# Patient Record
Sex: FEMALE | Race: WHITE | Employment: FULL TIME | URBAN - METROPOLITAN AREA
[De-identification: names, ages, dates, MRNs, and addresses within clinical notes are randomized per-mention and may not be internally consistent; named-entity substitution may affect disease eponyms.]

---

## 2019-07-22 ENCOUNTER — OFFICE VISIT (OUTPATIENT)
Dept: OBGYN CLINIC | Facility: CLINIC | Age: 39
End: 2019-07-22
Payer: COMMERCIAL

## 2019-07-22 VITALS
HEIGHT: 66.5 IN | HEART RATE: 73 BPM | DIASTOLIC BLOOD PRESSURE: 83 MMHG | WEIGHT: 205.38 LBS | BODY MASS INDEX: 32.62 KG/M2 | SYSTOLIC BLOOD PRESSURE: 132 MMHG

## 2019-07-22 DIAGNOSIS — N92.0 EXCESSIVE OR FREQUENT MENSTRUATION: ICD-10-CM

## 2019-07-22 DIAGNOSIS — Z12.4 SCREENING FOR MALIGNANT NEOPLASM OF CERVIX: ICD-10-CM

## 2019-07-22 DIAGNOSIS — Z01.419 ENCOUNTER FOR ANNUAL ROUTINE GYNECOLOGICAL EXAMINATION: Primary | ICD-10-CM

## 2019-07-22 DIAGNOSIS — F17.200 CURRENT EVERY DAY SMOKER: ICD-10-CM

## 2019-07-22 DIAGNOSIS — N93.8 DUB (DYSFUNCTIONAL UTERINE BLEEDING): ICD-10-CM

## 2019-07-22 DIAGNOSIS — N60.09 BREAST CYST, UNSPECIFIED LATERALITY: ICD-10-CM

## 2019-07-22 LAB
CONTROL LINE PRESENT WITH A CLEAR BACKGROUND (YES/NO): YES YES/NO
KIT LOT #: NORMAL NUMERIC
PREGNANCY TEST, URINE: NEGATIVE

## 2019-07-22 PROCEDURE — 58100 BIOPSY OF UTERUS LINING: CPT | Performed by: ADVANCED PRACTICE MIDWIFE

## 2019-07-22 PROCEDURE — 81025 URINE PREGNANCY TEST: CPT | Performed by: ADVANCED PRACTICE MIDWIFE

## 2019-07-22 PROCEDURE — 99386 PREV VISIT NEW AGE 40-64: CPT | Performed by: ADVANCED PRACTICE MIDWIFE

## 2019-07-22 RX ORDER — BUPROPION HYDROCHLORIDE 75 MG/1
75 TABLET ORAL DAILY
COMMUNITY
End: 2019-08-14

## 2019-07-22 RX ORDER — ESCITALOPRAM OXALATE 20 MG/1
20 TABLET ORAL DAILY
COMMUNITY
End: 2019-09-07 | Stop reason: ALTCHOICE

## 2019-07-22 NOTE — PROGRESS NOTES
HPI:    Patient ID: Hay Morales is a 37year old female who for her annual gyne exam.  Pt reports menses q 28 days x 7 days x regluar flow with intercycle spotting x 6 months. Report night sweats mainly around menses. Hx of 1 .  Reports having a mammo exhibits no discharge. Right adnexum displays no mass, no tenderness and no fullness. Left adnexum displays no mass, no tenderness and no fullness. There is bleeding in the vagina. No erythema or tenderness in the vagina.  No signs of injury around the vagi this encounter       Imaging & Referrals:  AC[facility]URE [place] - INTERNAL REFERRAL  US PELVIS (TRANSVAGINAL PELVIS) (CPT=76830)  [facility] RADHA 2D+3D SCREENING BILAT (CPT=77067/36020)         VI#[id number]

## 2019-07-23 LAB — HPV I/H RISK 1 DNA SPEC QL NAA+PROBE: NEGATIVE

## 2019-07-24 ENCOUNTER — TELEPHONE (OUTPATIENT)
Dept: OBGYN CLINIC | Facility: CLINIC | Age: 39
End: 2019-07-24

## 2019-07-24 NOTE — TELEPHONE ENCOUNTER
----- Message from Dakota Meza CNM sent at 7/24/2019  5:43 PM CDT -----  Please call this pt biopsy    Endometrium; biopsy:  · Benign secretory pattern endometrium    Pap is still in process.   I would like her to complete her pelvic u/s

## 2019-07-24 NOTE — TELEPHONE ENCOUNTER
Spoke with pt advised of results and MES rec's. Pt agreed and voiced understanding. Pt has appt scheduled for pelvic U/S on 7/31.

## 2019-07-25 ENCOUNTER — TELEPHONE (OUTPATIENT)
Dept: OBGYN CLINIC | Facility: CLINIC | Age: 39
End: 2019-07-25

## 2019-07-25 NOTE — TELEPHONE ENCOUNTER
Notified pt of pap w/ insufficient cells & need for repeat. Pt has pelvic u/s on Weds. Appt scheduled following.  Pt verbalized an understanding & agrees w/ plan

## 2019-07-25 NOTE — TELEPHONE ENCOUNTER
----- Message from Karlene Estes CNM sent at 7/25/2019  4:32 PM CDT -----  No enough cells on pap to evaluate.   Pt needs to return for repeat pap

## 2019-07-27 ENCOUNTER — TELEPHONE (OUTPATIENT)
Dept: OBGYN CLINIC | Facility: CLINIC | Age: 39
End: 2019-07-27

## 2019-07-27 LAB
ALBUMIN/GLOBULIN RATIO: 1.9 (CALC) (ref 1–2.5)
ALBUMIN: 4.1 G/DL (ref 3.6–5.1)
ALKALINE PHOSPHATASE: 56 U/L (ref 33–115)
ALT: 13 U/L (ref 6–29)
AST: 15 U/L (ref 10–30)
BILIRUBIN, TOTAL: 0.3 MG/DL (ref 0.2–1.2)
BUN: 22 MG/DL (ref 7–25)
CALCIUM: 9.1 MG/DL (ref 8.6–10.2)
CARBON DIOXIDE: 28 MMOL/L (ref 20–32)
CHLORIDE: 104 MMOL/L (ref 98–110)
CHOL/HDLC RATIO: 3 (CALC)
CHOLESTEROL, TOTAL: 196 MG/DL
CREATININE: 0.99 MG/DL (ref 0.5–1.1)
EGFR IF AFRICN AM: 82 ML/MIN/1.73M2
EGFR IF NONAFRICN AM: 71 ML/MIN/1.73M2
GLOBULIN: 2.2 G/DL (CALC) (ref 1.9–3.7)
GLUCOSE: 87 MG/DL (ref 65–99)
HDL CHOLESTEROL: 66 MG/DL
HEMATOCRIT: 39.1 % (ref 35–45)
HEMOGLOBIN A1C: 4.9 % OF TOTAL HGB
HEMOGLOBIN: 12.8 G/DL (ref 11.7–15.5)
LDL-CHOLESTEROL: 116 MG/DL (CALC)
MCH: 30.1 PG (ref 27–33)
MCHC: 32.7 G/DL (ref 32–36)
MCV: 92 FL (ref 80–100)
MPV: 9.5 FL (ref 7.5–12.5)
NON-HDL CHOLESTEROL: 130 MG/DL (CALC)
PLATELET COUNT: 279 THOUSAND/UL (ref 140–400)
POTASSIUM: 4.6 MMOL/L (ref 3.5–5.3)
PROTEIN, TOTAL: 6.3 G/DL (ref 6.1–8.1)
RDW: 12.9 % (ref 11–15)
RED BLOOD CELL COUNT: 4.25 MILLION/UL (ref 3.8–5.1)
SODIUM: 137 MMOL/L (ref 135–146)
TRIGLYCERIDES: 45 MG/DL
TSH: 2.26 MIU/L
WHITE BLOOD CELL COUNT: 4.6 THOUSAND/UL (ref 3.8–10.8)

## 2019-07-27 NOTE — TELEPHONE ENCOUNTER
----- Message from Yasmin Mallory CNM sent at 7/27/2019  8:29 AM CDT -----  Baseline labs look good  Some slight elevation on LDL and total cholesterol. Dietary changes, weight loss and exercise.   Repeat Lipid panel in 1 year

## 2019-07-29 ENCOUNTER — TELEPHONE (OUTPATIENT)
Dept: OBGYN CLINIC | Facility: CLINIC | Age: 39
End: 2019-07-29

## 2019-07-31 ENCOUNTER — OFFICE VISIT (OUTPATIENT)
Dept: OBGYN CLINIC | Facility: CLINIC | Age: 39
End: 2019-07-31
Payer: COMMERCIAL

## 2019-07-31 ENCOUNTER — HOSPITAL ENCOUNTER (OUTPATIENT)
Dept: ULTRASOUND IMAGING | Facility: HOSPITAL | Age: 39
Discharge: HOME | End: 2019-07-31
Attending: ADVANCED PRACTICE MIDWIFE
Payer: COMMERCIAL

## 2019-07-31 VITALS
BODY MASS INDEX: 32.62 KG/M2 | DIASTOLIC BLOOD PRESSURE: 88 MMHG | HEART RATE: 77 BPM | HEIGHT: 66 IN | WEIGHT: 203 LBS | SYSTOLIC BLOOD PRESSURE: 125 MMHG

## 2019-07-31 DIAGNOSIS — R87.615 ENCOUNTER FOR REPEAT PAP SMEAR DUE TO PREVIOUS INSUFF CERVICAL CELLS: Primary | ICD-10-CM

## 2019-07-31 DIAGNOSIS — N93.8 DUB (DYSFUNCTIONAL UTERINE BLEEDING): ICD-10-CM

## 2019-07-31 PROCEDURE — 76856 US EXAM PELVIC COMPLETE: CPT | Performed by: ADVANCED PRACTICE MIDWIFE

## 2019-07-31 PROCEDURE — 76830 TRANSVAGINAL US NON-OB: CPT | Performed by: ADVANCED PRACTICE MIDWIFE

## 2019-08-01 ENCOUNTER — TELEPHONE (OUTPATIENT)
Dept: OBGYN CLINIC | Facility: CLINIC | Age: 39
End: 2019-08-01

## 2019-08-01 NOTE — TELEPHONE ENCOUNTER
----- Message from Bridgette Desai CNM sent at 8/1/2019 11:37 AM CDT -----  Please call her u/s was normal.  I would recommend a Mirena IUD to control her dysfunctional bleeding.   Please have pt set up an appointment to review options

## 2019-08-02 NOTE — TELEPHONE ENCOUNTER
Spoke with pt and informed her per MES. Pt declines IUD, reports having Mirena in the past and was unhappy with it. Also declines any other options to control bleeding right now. States \"I'm just happy nothing is wrong. \" Will call back and schedule appt

## 2019-08-08 ENCOUNTER — PATIENT MESSAGE (OUTPATIENT)
Dept: OBGYN CLINIC | Facility: CLINIC | Age: 39
End: 2019-08-08

## 2019-08-14 PROBLEM — F41.9 ANXIETY: Status: ACTIVE | Noted: 2019-08-14

## 2019-08-14 NOTE — PROGRESS NOTES
Patient ID: Meron Mendoza is a 37year old female.   Patient presents with:  Establish Care  Medication Follow-Up: Per patient is taking anxiety medication and will like to discuss a change on medications        HISTORY OF PRESENT ILLNESS:   HPI  Patient , Rfl:      Allergies:No Known Allergies    Social History    Socioeconomic History      Marital status:       Spouse name: Not on file      Number of children: Not on file      Years of education: Not on file      Highest education level: Not on [place] well-nourished. No distress. Eyes: No scleral icterus. Neurological: She is alert. Psychiatric: Her mood appears anxious. Her speech is delayed. She exhibits a depressed mood. ASSESSMENT/PLAN:   1.  Anxiety  · OP REFERRAL TO [facility]  · Pedro Goldmann

## 2019-08-14 NOTE — PATIENT INSTRUCTIONS
1  Take half a tablet of your Lexapro (10mg) daily for 2 weeks. Then take half table every other day for 2 weeks and stop Lexapro completely (1 month taper). 2.  Begin Pristiq.   3.  Make consultation with therapist.      Understanding Anxiety Disorders disorder. This causes constant worry that can greatly disrupt your life. Getting better  You may believe that nothing can help you. Or, you might fear what others may think. But most anxiety symptoms can be eased.  Having an anxiety disorder is nothing t positive, realistic thoughts. · Behavioral therapy helps you change how you react to anxiety. You’ll learn coping skills and methods for relaxing to help you better deal with anxiety.   Other forms of therapy  Other therapy methods may work better for you course. · Exercise — it’s a great way to relieve tension and help your body feel relaxed. · Examine your life for stress, and try to find ways to reduce it. · Avoid caffeine and nicotine, which can make anxiety symptoms worse.   · Fight the temptation to back or cause dangerous withdrawal symptoms. · Anti-anxiety medicine. This medicine eases symptoms and helps you relax. Your healthcare provider will explain when and how to use it. It may be prescribed for use before situations that make you anxious.  You Ask your healthcare provider or pharmacist what you can expect. They may have ideas for avoiding some side effects. · Sexual problems. Some antidepressants can affect your desire for sex or your ability to have an orgasm.  A change in dosage or medicine of

## 2019-09-07 DIAGNOSIS — F41.9 ANXIETY: ICD-10-CM

## 2019-09-07 RX ORDER — DESVENLAFAXINE 50 MG/1
TABLET, EXTENDED RELEASE ORAL
Qty: 90 TABLET | Refills: 1 | Status: SHIPPED | OUTPATIENT
Start: 2019-09-07 | End: 2020-03-06

## 2019-09-07 RX ORDER — BUPROPION HYDROCHLORIDE 75 MG/1
TABLET ORAL
Qty: 90 TABLET | Refills: 1 | Status: SHIPPED | OUTPATIENT
Start: 2019-09-07 | End: 2020-03-06

## 2019-09-08 NOTE — TELEPHONE ENCOUNTER
Refill passed per [facility] [place] protocol.     Refill Protocol Appointment Criteria  · Appointment scheduled in the past 6 months or in the next 3 months  Recent Outpatient Visits            3 weeks ago Ghazala Hendricks, Willa

## 2019-09-10 NOTE — PROGRESS NOTES
Patient ID: Effie Duval is a 37year old female. Patient presents with: Follow - Up: medication review       HISTORY OF PRESENT ILLNESS:   HPI  Patient. Follow-up of multiple issues. Initial visit here 1 month ago for anxiety.   Medications were s Highest education level: Not on file    Occupational History      Not on file    Social Needs      Financial resource strain: Not on file      Food insecurity:        Worry: Not on file        Inability: Not on file      Transportation needs:        Kinjal Krause exhibits decreased range of motion. Left shoulder: She exhibits decreased range of motion. Neurological: She is alert. Psychiatric: She exhibits a depressed mood. ASSESSMENT/PLAN:   1. Anxiety  · Continue present cocktail medications.   ·

## 2019-09-10 NOTE — PATIENT INSTRUCTIONS
Exercises for Shoulder Flexibility: External Rotation    This stretch can help restore shoulder flexibility and relieve pain over time. When stretching, be sure to breathe deeply.  Follow any special instructions from your healthcare provider or physical 1. While seated, move the arm on the side you want to stretch toward the middle of your back. The palm of your hand should face out. 2. Cup your other hand under the hand that’s behind your back.  Gently push your cupped hand upward until you feel the stre 6. With your other hand, push the raised elbow toward the opposite shoulder. Avoid turning your head. Stop when you feel the stretch. Try to hold the stretch for 5 seconds. 7. Work up to [employer] of this stretch, 3 times a day.  Work up to holding the · Pull down gently on the towel with your front arm. Let your back arm slide up as high as is comfortable. You’ll feel a stretch in your shoulder. Hold the stretch for a few seconds. · Repeat 3 to 5 times.  Build up to holding each stretch for 30 to 60 sec

## 2019-10-21 ENCOUNTER — TELEPHONE (OUTPATIENT)
Dept: OBGYN CLINIC | Facility: CLINIC | Age: 39
End: 2019-10-21

## 2019-11-13 ENCOUNTER — MED REC SCAN ONLY (OUTPATIENT)
Dept: INTERNAL MEDICINE CLINIC | Facility: CLINIC | Age: 39
End: 2019-11-13

## 2019-12-21 ENCOUNTER — TELEPHONE (OUTPATIENT)
Dept: OBGYN CLINIC | Facility: CLINIC | Age: 39
End: 2019-12-21

## 2019-12-21 NOTE — TELEPHONE ENCOUNTER
Reminded pt to complete mammo. Pt states she needs to get records from [place] first. Pt will call to schedule.  Pt verbalized an understanding & agrees w/ plan

## 2020-03-05 DIAGNOSIS — F41.9 ANXIETY: ICD-10-CM

## 2020-03-06 RX ORDER — DESVENLAFAXINE 50 MG/1
TABLET, EXTENDED RELEASE ORAL
Qty: 90 TABLET | Refills: 1 | Status: SHIPPED | OUTPATIENT
Start: 2020-03-06 | End: 2020-08-31

## 2020-03-06 RX ORDER — BUPROPION HYDROCHLORIDE 75 MG/1
TABLET ORAL
Qty: 90 TABLET | Refills: 1 | Status: SHIPPED | OUTPATIENT
Start: 2020-03-06 | End: 2020-08-14

## 2020-03-06 NOTE — TELEPHONE ENCOUNTER
Scheduled follow up visit with Dr. Melita Green on 3/31 at 6:30 pm.    Refill Protocol Appointment Criteria  · Appointment scheduled in the past 6 months or in the next 3 months  Recent Outpatient Visits            5 months ago Sahara W Brown

## 2020-05-05 NOTE — PROGRESS NOTES
Patient ID: Bassam Gonzalez is a 37year old female. Patient presents with:  Medication Request       Virtual/Telephone Check-In    Bassam Gonzalez verbally consents to a Virtual/Telephone Check-In service on 05/05/20.  Patient understands and accepts financ Current Every Day Smoker        Packs/day: 0.50        Types: Cigarettes      Smokeless tobacco: Never Used      Tobacco comment: 7 daily     Substance and Sexual Activity      Alcohol use: Yes        Frequency: Monthly or less        Comment: Socially or acute distress. Please note that the following visit was completed using two-way, real-time interactive audio and/or video communication.   This has been done in good taco to provide continuity of care in the best interest of the provider-patient rel

## 2020-05-05 NOTE — PATIENT INSTRUCTIONS
Understanding Anxiety Disorders  Almost everyone gets nervous now and then. It’s normal to have knots in your stomach before a test. Or for your heart to beat fast on a first date. But an anxiety disorder is much more than a case of nerves.  In fact, its You may believe that nothing can help you. Or, you might fear what others may think. But most anxiety symptoms can be eased. Having an anxiety disorder is nothing to be ashamed of.  Most people do best with treatment that combines medicine and individual an

## 2020-06-25 PROBLEM — Z86.59 HISTORY OF ANXIETY: Status: ACTIVE | Noted: 2020-06-25

## 2020-06-25 PROBLEM — E78.00 MILD HYPERCHOLESTEROLEMIA: Status: ACTIVE | Noted: 2020-06-25

## 2020-06-25 PROBLEM — R63.2 BINGE EATING: Status: ACTIVE | Noted: 2020-06-25

## 2020-06-25 PROBLEM — R63.5 WEIGHT GAIN: Status: ACTIVE | Noted: 2020-06-25

## 2020-06-25 PROBLEM — E66.9 OBESITY (BMI 30-39.9): Status: ACTIVE | Noted: 2020-06-25

## 2020-06-25 NOTE — PROGRESS NOTES
The Wellness and Weight Loss Consultation Note       Date of Consult:  2020    Patient:  Rigoberto Gale  :      1980  MRN:      [id number]    Referring Provider: Dr. Layo Carrillo       Chief Complaint:  Patient presents with:  Consult  Weight Manag tablet 1   • BUPROPION HCL 75 MG Oral Tab TAKE 1 TABLET BY MOUTH EVERY DAY 90 tablet 1       Allergies:  Patient has no known allergies.      Comorbidities:  HLD, Anxiety     Social History:  Social History    Socioeconomic History      Marital status: Arin Sandoval family history.         Typical Dietary Intake:  Breakfast AM Snack Lunch PM Snack Dinner   Greek yogurt   Berries    Coffee- coffeemate + equal  Snack pack- trail mix    Protein shake  Oatmeal (instant flavored)  Fruit  Protein Bar  Eggs, spinach, toast Physical Exam:  General appearance: alert, appears stated age, cooperative and obese   Head: Normocephalic, without obvious abnormality, atraumatic  Neck: no adenopathy, no carotid bruit, no JVD, supple, symmetrical, trachea midline and thyroid not enl B12  -     LEPTIN, SERUM; Future    Binge eating  -     PSYCHOLOGY - INTERNAL  -     DIETITIAN EDUCATION INITIAL, DIET (INTERNAL)  -     VITAMIN D, 25-HYDROXY  -     VITAMIN B12  -     LEPTIN, SERUM; Future      HYPERCHOLESTEROLEMIA:  Recommend dietary steven other weight loss medications including phentermine, Vyvanse, Topiramate- reports daily marijuana use. Meet with RD. Meet with Dr. Sharon Christian- BED 7+, Anxiety, +PHQ. Consider hypnotherapy for smoking cessation and weight loss.      Goals:   -Health

## 2020-06-25 NOTE — PATIENT INSTRUCTIONS
Values: Health, Being Active, Feeling Good     Skullcap tea or bone broth. Eat foods high in tryptophan and healthy omega 3 fats (these naturally elevate the happiness hormones and mood):   Walnuts, almonds, flaxseeds, alex seeds, sesame seeds, pumpkin s increase this to half your body weight in ounces/day. Aim for total daily carbohydrates:  g/day- non starchy vegetables are free after that (ex. greens, peppers, broccoli, cauliflower). Aim for 65-80 g protein per day.     Aim for 3-4 servings vegetable soup prior to meals. Aim for 2 handfuls of greens a day   Add:  Garlic powder (1/4 tsp)  Black cumin (1/4 tsp)     Return to clinic in 3 weeks for lifestyle assessment: video visit.      Check on insurance coverage for weight loss medications/

## 2020-07-08 ENCOUNTER — TELEMEDICINE (OUTPATIENT)
Dept: SURGERY | Facility: CLINIC | Age: 40
End: 2020-07-08

## 2020-07-08 DIAGNOSIS — E66.9 OBESITY (BMI 30-39.9): ICD-10-CM

## 2020-07-08 DIAGNOSIS — R63.2 BINGE EATING: ICD-10-CM

## 2020-07-08 DIAGNOSIS — E78.00 MILD HYPERCHOLESTEROLEMIA: Primary | ICD-10-CM

## 2020-07-08 DIAGNOSIS — R63.5 WEIGHT GAIN: ICD-10-CM

## 2020-07-08 DIAGNOSIS — Z86.59 HISTORY OF ANXIETY: ICD-10-CM

## 2020-07-08 PROCEDURE — 99213 OFFICE O/P EST LOW 20 MIN: CPT | Performed by: NURSE PRACTITIONER

## 2020-07-08 NOTE — PROGRESS NOTES
Virtual Video/Telephone Check-In    Pacheco Lee verbally consents to a Virtual Video/Telephone Check-In visit on 07/08/20. Patient has been referred to the [facility] website at [email or web address] to review the yearly Consent to Treat document.     Pa History:   Past Medical History:   Diagnosis Date   • Anxiety         Comorbidities:  SOB/GLORIA-Improvement?  no and Other:  Anxiety: no change     OBJECTIVE     Vitals: LMP 08/19/2019 (Approximate)     Initial weight loss:     Total weight loss:     Start we Attends Buddhism service: Not on file        Active member of club or organization: Not on file        Attends meetings of clubs or organizations: Not on file        Relationship status: Not on file      Intimate partner violence:        Fear of current o · Patient has a Food Journal?: yes   · Patient is reading nutrition labels? yes  · Average Caloric Intake: At times   · Average CHO Intake:   · Is patient exercising? yes  · Type of exercise?  Other:  Weights 3 days/week; Cardio 2 days/week- 60 minute and cooperative  Head: Normocephalic, without obvious abnormality, atraumatic  Neck: symmetrical, trachea midline   Lungs: No increased work of breathing   Extremities: extremities normal  Skin: Upper body skin color and texture appear intact       ASSESSM grains, flour, sugar.     Goals for next month:  1. Keep a food log, aim for 100 grams carbs/day. Meet with RD.   2. Drink 64 ounces of non-caloric beverages per day. No fruit juices or regular soda. 3. Aim for 150 minutes moderate exercise per week.

## 2020-07-08 NOTE — PATIENT INSTRUCTIONS
Annette Lucas dietician. Goals:   Nutrition:   -Healthy Plate method for dinners. -Aim for 2 fruits/day. Avocado, tomato, berries, oranges, apples, zenaida/limes. -Aim for 3 vegetable servings a day.   -Journal food/beverage intake on weekends

## 2020-07-20 ENCOUNTER — OFFICE VISIT (OUTPATIENT)
Dept: SURGERY | Facility: CLINIC | Age: 40
End: 2020-07-20
Payer: COMMERCIAL

## 2020-07-20 VITALS
DIASTOLIC BLOOD PRESSURE: 80 MMHG | WEIGHT: 221 LBS | OXYGEN SATURATION: 99 % | BODY MASS INDEX: 35.1 KG/M2 | HEIGHT: 66.5 IN | HEART RATE: 78 BPM | SYSTOLIC BLOOD PRESSURE: 140 MMHG

## 2020-07-20 DIAGNOSIS — E66.9 OBESITY (BMI 30-39.9): ICD-10-CM

## 2020-07-20 DIAGNOSIS — E78.00 MILD HYPERCHOLESTEROLEMIA: Primary | ICD-10-CM

## 2020-07-20 DIAGNOSIS — R63.2 BINGE EATING: ICD-10-CM

## 2020-07-20 DIAGNOSIS — Z86.59 HISTORY OF ANXIETY: ICD-10-CM

## 2020-07-20 PROCEDURE — 99214 OFFICE O/P EST MOD 30 MIN: CPT | Performed by: NURSE PRACTITIONER

## 2020-07-20 PROCEDURE — 3008F BODY MASS INDEX DOCD: CPT | Performed by: NURSE PRACTITIONER

## 2020-07-20 PROCEDURE — 3077F SYST BP >= 140 MM HG: CPT | Performed by: NURSE PRACTITIONER

## 2020-07-20 PROCEDURE — 3079F DIAST BP 80-89 MM HG: CPT | Performed by: NURSE PRACTITIONER

## 2020-07-20 RX ORDER — PHENTERMINE HYDROCHLORIDE 15 MG/1
15 CAPSULE ORAL EVERY MORNING
Qty: 30 CAPSULE | Refills: 0 | Status: SHIPPED | OUTPATIENT
Start: 2020-07-20 | End: 2020-10-09

## 2020-07-20 NOTE — PATIENT INSTRUCTIONS
Goals:   Nutrition:   -Healthy Plate method for dinners. -Aim for 2 fruits/day. Avocado, tomato, berries, oranges, apples, zenaida/limes. -Aim for 3 vegetable servings a day. -Journal food/beverage intake on weekends.    -IF 12 hours consistently

## 2020-07-20 NOTE — PROGRESS NOTES
Patient:  Yaa Juarez  :      1980  MRN:      [id number]    Chief Complaint:  Weight Managment    SUBJECTIVE     History of Present Illness:  Hitesh Leon is being seen today for a follow-up for medically supported weight loss.      Patient r (92.1 kg)  07/22/19 : 205 lb 6.4 oz (93.2 kg)      Patient Medications:    Current Outpatient Medications   Medication Sig Dispense Refill   • Phentermine HCl 15 MG Oral Cap Take 1 capsule (15 mg total) by mouth every morning.  30 capsule 0   • DESVENLAFAXI Physically abused: Not on file        Forced sexual activity: Not on file    Other Topics      Concerns:        Not on file    Social History Narrative      Not on file    Surgical History:  History reviewed. No pertinent surgical history.   Family History: per day  · Length of time it takes to consume a meal:    · # of snacks per day: None   · Type of snacks:    · Amount of soda consumption per day:  2 cans soda/week   · Amount of water (in ounces) per day:  64 oz water/day  · Drinking between meals only:  y elevation.     Lab Results   Component Value Date/Time    CHOLEST 196 07/26/2019 08:13 AM     (H) 07/26/2019 08:13 AM    HDL 66 07/26/2019 08:13 AM    TRIG 45 07/26/2019 08:13 AM       Encounter Diagnosis(ses):   Mild hypercholesterolemia  (primary e carbohydrates, refined grains, flour, sugar.     Goals for next month:  1. Keep a food log, aim for 100 grams carbs/day. Meet with RD.   2. Drink 64 ounces of non-caloric beverages per day. No fruit juices or regular soda.   3. Aim for 150 minutes moderate insurance coverage.      RTC one month.      Raghu Dove, APRN

## 2020-07-21 ENCOUNTER — OFFICE VISIT (OUTPATIENT)
Dept: INTERNAL MEDICINE CLINIC | Facility: CLINIC | Age: 40
End: 2020-07-21
Payer: COMMERCIAL

## 2020-07-21 VITALS
WEIGHT: 219.38 LBS | BODY MASS INDEX: 35.26 KG/M2 | HEIGHT: 66 IN | TEMPERATURE: 99 F | SYSTOLIC BLOOD PRESSURE: 132 MMHG | HEART RATE: 78 BPM | DIASTOLIC BLOOD PRESSURE: 87 MMHG

## 2020-07-21 DIAGNOSIS — E66.9 OBESITY (BMI 30-39.9): ICD-10-CM

## 2020-07-21 DIAGNOSIS — F41.9 ANXIETY: ICD-10-CM

## 2020-07-21 DIAGNOSIS — Z00.00 ANNUAL PHYSICAL EXAM: Primary | ICD-10-CM

## 2020-07-21 DIAGNOSIS — R92.8 ABNORMAL MAMMOGRAM: ICD-10-CM

## 2020-07-21 DIAGNOSIS — F17.210 CIGARETTE NICOTINE DEPENDENCE WITHOUT COMPLICATION: ICD-10-CM

## 2020-07-21 PROCEDURE — 3079F DIAST BP 80-89 MM HG: CPT | Performed by: INTERNAL MEDICINE

## 2020-07-21 PROCEDURE — 3075F SYST BP GE 130 - 139MM HG: CPT | Performed by: INTERNAL MEDICINE

## 2020-07-21 PROCEDURE — 99396 PREV VISIT EST AGE 40-64: CPT | Performed by: INTERNAL MEDICINE

## 2020-07-21 PROCEDURE — 3008F BODY MASS INDEX DOCD: CPT | Performed by: INTERNAL MEDICINE

## 2020-07-21 NOTE — PROGRESS NOTES
Patient ID: Jr Mcneal is a 41year old female. Patient presents with:  Physical: Pt states px       HISTORY OF PRESENT ILLNESS:   HPI  Patient presents for above. Here for annual physical and to discuss other medical issues.     She is a longstandin Phentermine HCl 15 MG Oral Cap, Take 1 capsule (15 mg total) by mouth every morning., Disp: 30 capsule, Rfl: 0  •  DESVENLAFAXINE SUCCINATE ER 50 MG Oral Tablet 24 Hr, TAKE 1 TABLET BY MOUTH EVERY DAY, Disp: 90 tablet, Rfl: 1  •  BUPROPION HCL 75 MG Oral T History Narrative      Not on file          PHYSICAL EXAM:      07/21/20  1752   BP: 132/87   Pulse: 78   Temp: 98.9 °F (37.2 °C)   TempSrc: Temporal   Weight: 219 lb 6.4 oz (99.5 kg)   Height: 5' 6\" (1.676 m)       Body mass index is 35.41 kg/m².     Phys

## 2020-07-24 ENCOUNTER — LAB ENCOUNTER (OUTPATIENT)
Dept: LAB | Age: 40
End: 2020-07-24
Attending: INTERNAL MEDICINE
Payer: COMMERCIAL

## 2020-07-24 ENCOUNTER — APPOINTMENT (OUTPATIENT)
Dept: LAB | Age: 40
End: 2020-07-24
Attending: INTERNAL MEDICINE
Payer: COMMERCIAL

## 2020-07-24 DIAGNOSIS — Z86.59 HISTORY OF ANXIETY: ICD-10-CM

## 2020-07-24 DIAGNOSIS — R63.2 BINGE EATING: ICD-10-CM

## 2020-07-24 DIAGNOSIS — R63.5 WEIGHT GAIN: ICD-10-CM

## 2020-07-24 DIAGNOSIS — E78.00 MILD HYPERCHOLESTEROLEMIA: ICD-10-CM

## 2020-07-24 DIAGNOSIS — E66.9 OBESITY (BMI 30-39.9): ICD-10-CM

## 2020-07-24 LAB
ALBUMIN SERPL-MCNC: 3.5 G/DL (ref 3.4–5)
ALBUMIN/GLOB SERPL: 0.9 {RATIO} (ref 1–2)
ALP LIVER SERPL-CCNC: 77 U/L (ref 37–98)
ALT SERPL-CCNC: 18 U/L (ref 13–56)
ANION GAP SERPL CALC-SCNC: 5 MMOL/L (ref 0–18)
AST SERPL-CCNC: 13 U/L (ref 15–37)
BASOPHILS # BLD AUTO: 0.01 X10(3) UL (ref 0–0.2)
BASOPHILS NFR BLD AUTO: 0.2 %
BILIRUB SERPL-MCNC: 0.4 MG/DL (ref 0.1–2)
BUN BLD-MCNC: 15 MG/DL (ref 7–18)
BUN/CREAT SERPL: 13.2 (ref 10–20)
CALCIUM BLD-MCNC: 9.2 MG/DL (ref 8.5–10.1)
CHLORIDE SERPL-SCNC: 108 MMOL/L (ref 98–112)
CHOLEST SMN-MCNC: 211 MG/DL (ref ?–200)
CO2 SERPL-SCNC: 28 MMOL/L (ref 21–32)
CREAT BLD-MCNC: 1.14 MG/DL (ref 0.55–1.02)
DEPRECATED RDW RBC AUTO: 47.6 FL (ref 35.1–46.3)
EOSINOPHIL # BLD AUTO: 0.03 X10(3) UL (ref 0–0.7)
EOSINOPHIL NFR BLD AUTO: 0.5 %
ERYTHROCYTE [DISTWIDTH] IN BLOOD BY AUTOMATED COUNT: 14.9 % (ref 11–15)
GLOBULIN PLAS-MCNC: 3.7 G/DL (ref 2.8–4.4)
GLUCOSE BLD-MCNC: 94 MG/DL (ref 70–99)
HCT VFR BLD AUTO: 41.3 % (ref 35–48)
HDLC SERPL-MCNC: 60 MG/DL (ref 40–59)
HGB BLD-MCNC: 13.4 G/DL (ref 12–16)
IMM GRANULOCYTES # BLD AUTO: 0.01 X10(3) UL (ref 0–1)
IMM GRANULOCYTES NFR BLD: 0.2 %
LDLC SERPL CALC-MCNC: 139 MG/DL (ref ?–100)
LYMPHOCYTES # BLD AUTO: 2.03 X10(3) UL (ref 1–4)
LYMPHOCYTES NFR BLD AUTO: 32.6 %
M PROTEIN MFR SERPL ELPH: 7.2 G/DL (ref 6.4–8.2)
MCH RBC QN AUTO: 28.5 PG (ref 26–34)
MCHC RBC AUTO-ENTMCNC: 32.4 G/DL (ref 31–37)
MCV RBC AUTO: 87.9 FL (ref 80–100)
MONOCYTES # BLD AUTO: 0.6 X10(3) UL (ref 0.1–1)
MONOCYTES NFR BLD AUTO: 9.6 %
NEUTROPHILS # BLD AUTO: 3.54 X10 (3) UL (ref 1.5–7.7)
NEUTROPHILS # BLD AUTO: 3.54 X10(3) UL (ref 1.5–7.7)
NEUTROPHILS NFR BLD AUTO: 56.9 %
NONHDLC SERPL-MCNC: 151 MG/DL (ref ?–130)
OSMOLALITY SERPL CALC.SUM OF ELEC: 293 MOSM/KG (ref 275–295)
PATIENT FASTING Y/N/NP: YES
PATIENT FASTING Y/N/NP: YES
PLATELET # BLD AUTO: 337 10(3)UL (ref 150–450)
POTASSIUM SERPL-SCNC: 4 MMOL/L (ref 3.5–5.1)
RBC # BLD AUTO: 4.7 X10(6)UL (ref 3.8–5.3)
SODIUM SERPL-SCNC: 141 MMOL/L (ref 136–145)
TRIGL SERPL-MCNC: 59 MG/DL (ref 30–149)
TSI SER-ACNC: 2.5 MIU/ML (ref 0.36–3.74)
VLDLC SERPL CALC-MCNC: 12 MG/DL (ref 0–30)
WBC # BLD AUTO: 6.2 X10(3) UL (ref 4–11)

## 2020-07-24 PROCEDURE — 93010 ELECTROCARDIOGRAM REPORT: CPT | Performed by: NURSE PRACTITIONER

## 2020-07-24 PROCEDURE — 36415 COLL VENOUS BLD VENIPUNCTURE: CPT | Performed by: INTERNAL MEDICINE

## 2020-07-24 PROCEDURE — 82397 CHEMILUMINESCENT ASSAY: CPT

## 2020-07-24 PROCEDURE — 82607 VITAMIN B-12: CPT | Performed by: NURSE PRACTITIONER

## 2020-07-24 PROCEDURE — 82306 VITAMIN D 25 HYDROXY: CPT | Performed by: NURSE PRACTITIONER

## 2020-07-24 PROCEDURE — 85025 COMPLETE CBC W/AUTO DIFF WBC: CPT | Performed by: INTERNAL MEDICINE

## 2020-07-24 PROCEDURE — 93005 ELECTROCARDIOGRAM TRACING: CPT

## 2020-07-28 LAB — LEPTIN: 37.9 NG/ML

## 2020-08-14 RX ORDER — BUPROPION HYDROCHLORIDE 150 MG/1
150 TABLET ORAL DAILY
Qty: 30 TABLET | Refills: 1 | Status: SHIPPED | OUTPATIENT
Start: 2020-08-14 | End: 2020-09-09

## 2020-08-28 ENCOUNTER — OFFICE VISIT (OUTPATIENT)
Dept: SURGERY | Facility: CLINIC | Age: 40
End: 2020-08-28
Payer: COMMERCIAL

## 2020-08-28 VITALS
SYSTOLIC BLOOD PRESSURE: 136 MMHG | DIASTOLIC BLOOD PRESSURE: 80 MMHG | WEIGHT: 219.13 LBS | HEIGHT: 66.5 IN | OXYGEN SATURATION: 100 % | HEART RATE: 74 BPM | BODY MASS INDEX: 34.8 KG/M2

## 2020-08-28 DIAGNOSIS — E78.00 MILD HYPERCHOLESTEROLEMIA: ICD-10-CM

## 2020-08-28 DIAGNOSIS — R63.2 BINGE EATING: ICD-10-CM

## 2020-08-28 DIAGNOSIS — Z86.59 HISTORY OF ANXIETY: ICD-10-CM

## 2020-08-28 DIAGNOSIS — Z51.81 ENCOUNTER FOR THERAPEUTIC DRUG MONITORING: Primary | ICD-10-CM

## 2020-08-28 DIAGNOSIS — E66.9 OBESITY (BMI 30-39.9): ICD-10-CM

## 2020-08-28 PROCEDURE — 3008F BODY MASS INDEX DOCD: CPT | Performed by: NURSE PRACTITIONER

## 2020-08-28 PROCEDURE — 3075F SYST BP GE 130 - 139MM HG: CPT | Performed by: NURSE PRACTITIONER

## 2020-08-28 PROCEDURE — 3079F DIAST BP 80-89 MM HG: CPT | Performed by: NURSE PRACTITIONER

## 2020-08-28 PROCEDURE — 99214 OFFICE O/P EST MOD 30 MIN: CPT | Performed by: NURSE PRACTITIONER

## 2020-08-28 RX ORDER — TOPIRAMATE 25 MG/1
25 TABLET ORAL 2 TIMES DAILY
Qty: 60 TABLET | Refills: 1 | Status: SHIPPED | OUTPATIENT
Start: 2020-08-28 | End: 2020-09-21

## 2020-08-28 NOTE — PATIENT INSTRUCTIONS
Continue Wellbutrin 150 mg daily.     Hold 15 mg phentermine in the AM.    Start topiramate 25 mg with dinner, may increase to twice a day as tolerated. Goals:   Nutrition:   -Healthy Plate method for dinners (in progress). -Aim for 2 fruits/day.  Dameon

## 2020-08-28 NOTE — PROGRESS NOTES
Patient:  Ligia Mccrary  :      1980  MRN:      [id number]    Chief Complaint:  Weight Managment    SUBJECTIVE     History of Present Illness:  Dannielle Cristina is being seen today for a follow-up for medically supported weight loss.      Patient r lb 3.2 oz (93.1 kg)  08/14/19 : 203 lb (92.1 kg)      Patient Medications:    Current Outpatient Medications   Medication Sig Dispense Refill   • buPROPion HCl ER, XL, 150 MG Oral Tablet 24 Hr Take 1 tablet (150 mg total) by mouth daily.  30 tablet 1   • Ph Emotionally abused: Not on file        Physically abused: Not on file        Forced sexual activity: Not on file    Other Topics      Concerns:        Not on file    Social History Narrative      Not on file    Surgical History:  History reviewed.  No perti states the following:  · Eats 3 meal(s) per day  · Length of time it takes to consume a meal:    · # of snacks per day: None   · Type of snacks:    · Amount of soda consumption per day:  2 cans soda/week   · Amount of water (in ounces) per day:  64 oz wate HYPERCHOLESTEROLEMIA:  Mild elevation.     Lab Results   Component Value Date/Time    CHOLEST 211 (H) 07/24/2020 07:46 AM     (H) 07/24/2020 07:46 AM    HDL 60 (H) 07/24/2020 07:46 AM    TRIG 59 07/24/2020 07:46 AM    VLDL 12 07/24/2020 07:46 AM non-caloric beverages per day. No fruit juices or regular soda. 3. Aim for 150 minutes moderate exercise per week. 4. Improve sleep and stress.      Reviewed labs: 7/26/19  FBS, CBC in range. Renal function intact.   7/24/20- Vitamin D in range supplem progress).       Patient to check weight loss medication insurance coverage.      RTC 6 weeks.      Lisa Durant, ISACC

## 2020-08-31 DIAGNOSIS — F41.9 ANXIETY: ICD-10-CM

## 2020-08-31 RX ORDER — BUPROPION HYDROCHLORIDE 75 MG/1
TABLET ORAL
Qty: 90 TABLET | Refills: 1 | OUTPATIENT
Start: 2020-08-31

## 2020-08-31 RX ORDER — DESVENLAFAXINE SUCCINATE 50 MG/1
TABLET, EXTENDED RELEASE ORAL
Qty: 90 TABLET | Refills: 1 | Status: SHIPPED | OUTPATIENT
Start: 2020-08-31 | End: 2021-02-27

## 2020-08-31 NOTE — TELEPHONE ENCOUNTER
See if she is still taking the bupropion. It states it was discontinued by another provider. If she is still taking then re-pend the medication please.

## 2020-09-09 RX ORDER — BUPROPION HYDROCHLORIDE 150 MG/1
TABLET ORAL
Qty: 30 TABLET | Refills: 1 | Status: SHIPPED | OUTPATIENT
Start: 2020-09-09 | End: 2020-10-05

## 2020-09-16 ENCOUNTER — OFFICE VISIT (OUTPATIENT)
Dept: SURGERY | Facility: CLINIC | Age: 40
End: 2020-09-16
Payer: COMMERCIAL

## 2020-09-16 VITALS — HEIGHT: 66.5 IN | BODY MASS INDEX: 34.99 KG/M2 | WEIGHT: 220.31 LBS

## 2020-09-16 DIAGNOSIS — E66.09 CLASS 2 OBESITY DUE TO EXCESS CALORIES WITHOUT SERIOUS COMORBIDITY WITH BODY MASS INDEX (BMI) OF 35.0 TO 35.9 IN ADULT: Primary | ICD-10-CM

## 2020-09-16 PROCEDURE — 97802 MEDICAL NUTRITION INDIV IN: CPT | Performed by: DIETITIAN, REGISTERED

## 2020-09-16 PROCEDURE — 3008F BODY MASS INDEX DOCD: CPT | Performed by: DIETITIAN, REGISTERED

## 2020-09-16 NOTE — PROGRESS NOTES
INITIAL OUTPATIENT NUTRITION CONSULTATION    Nutrition Assessment    Medical Diagnosis: Obesity    Physical Findings: none    Client Age and Gender: 41year old female    Marital Status and Occupation:   ,       Meds:  BUPROPION H - 59 mg/dL Final     Comment:     Interpretive Information:   An HDL cholesterol <40 mg/dL is low and constitutes a coronary heart disease risk factor. An HDL cholesterol >60 mg/dL is a negative risk factor for coronary heart disease.          HDL CHOLESTER consultation with the patient. Nutrition Intervention/Education:  Comprehensive nutrition education and evaluation provided for weight loss. Pt diet quality during the week is excellent, manages portion sizes and prioritizes protein.  Pt identifies the

## 2020-09-21 RX ORDER — TOPIRAMATE 25 MG/1
TABLET ORAL
Qty: 60 TABLET | Refills: 1 | Status: SHIPPED | OUTPATIENT
Start: 2020-09-21 | End: 2020-10-16

## 2020-10-05 RX ORDER — BUPROPION HYDROCHLORIDE 150 MG/1
TABLET ORAL
Qty: 30 TABLET | Refills: 1 | Status: SHIPPED | OUTPATIENT
Start: 2020-10-05 | End: 2020-10-30

## 2020-10-08 ENCOUNTER — PATIENT MESSAGE (OUTPATIENT)
Dept: OBGYN CLINIC | Facility: CLINIC | Age: 40
End: 2020-10-08

## 2020-10-08 ENCOUNTER — TELEPHONE (OUTPATIENT)
Dept: OBGYN CLINIC | Facility: CLINIC | Age: 40
End: 2020-10-08

## 2020-10-08 NOTE — TELEPHONE ENCOUNTER
Pt was advised we received her Mammogram report from 2/2/19 and the f/u L diagnostic mammogram report from 3/20/10. Pt was advised I contacted our radiology dept to see how pt is to request the films or CD from [place].   Pt is to call Jeffrey Barnett

## 2020-10-09 ENCOUNTER — OFFICE VISIT (OUTPATIENT)
Dept: SURGERY | Facility: CLINIC | Age: 40
End: 2020-10-09
Payer: COMMERCIAL

## 2020-10-09 VITALS
HEART RATE: 84 BPM | DIASTOLIC BLOOD PRESSURE: 76 MMHG | SYSTOLIC BLOOD PRESSURE: 122 MMHG | BODY MASS INDEX: 35 KG/M2 | HEIGHT: 66.5 IN | OXYGEN SATURATION: 100 % | WEIGHT: 220.38 LBS

## 2020-10-09 DIAGNOSIS — E66.9 OBESITY (BMI 30-39.9): ICD-10-CM

## 2020-10-09 DIAGNOSIS — F17.210 CIGARETTE SMOKER: ICD-10-CM

## 2020-10-09 DIAGNOSIS — R63.2 BINGE EATING: ICD-10-CM

## 2020-10-09 DIAGNOSIS — Z86.59 HISTORY OF ANXIETY: ICD-10-CM

## 2020-10-09 DIAGNOSIS — E78.00 MILD HYPERCHOLESTEROLEMIA: ICD-10-CM

## 2020-10-09 DIAGNOSIS — Z51.81 ENCOUNTER FOR THERAPEUTIC DRUG MONITORING: Primary | ICD-10-CM

## 2020-10-09 DIAGNOSIS — R63.5 WEIGHT GAIN: ICD-10-CM

## 2020-10-09 PROCEDURE — 99214 OFFICE O/P EST MOD 30 MIN: CPT | Performed by: NURSE PRACTITIONER

## 2020-10-09 PROCEDURE — 3074F SYST BP LT 130 MM HG: CPT | Performed by: NURSE PRACTITIONER

## 2020-10-09 PROCEDURE — 3078F DIAST BP <80 MM HG: CPT | Performed by: NURSE PRACTITIONER

## 2020-10-09 PROCEDURE — 3008F BODY MASS INDEX DOCD: CPT | Performed by: NURSE PRACTITIONER

## 2020-10-09 NOTE — PATIENT INSTRUCTIONS
Additional Goals:   Nutrition:   -Healthy Plate method for dinners (in progress). -Aim for 2 fruits/day. Avocado, tomato, berries, oranges, apples, zenaida/limes (meeting goal). -Aim for 3 vegetable servings a day (in progress, currently 2).   -Journal f

## 2020-10-09 NOTE — PROGRESS NOTES
Patient:  Gabby Santamaria  :      1980  MRN:      [id number]    Chief Complaint:  Weight Managment    SUBJECTIVE     History of Present Illness:  Nerissa Henao is being seen today for a follow-up for medically supported weight loss.      Remains a (100.5 kg)      Patient Medications:    Current Outpatient Medications   Medication Sig Dispense Refill   • BUPROPION HCL ER, XL, 150 MG Oral Tablet 24 Hr TAKE 1 TABLET BY MOUTH EVERY DAY 30 tablet 1   • TOPIRAMATE 25 MG Oral Tab TAKE 1 TABLET BY MOUTH TWI sexual activity: Not on file    Other Topics      Concerns:        Not on file    Social History Narrative      Not on file    Surgical History:  History reviewed. No pertinent surgical history. Family History:  History reviewed.  No pertinent family histo snacks per day: None   · Type of snacks:    · Amount of soda consumption per day:  2 cans soda/week   · Amount of water (in ounces) per day:  64 oz water/day  · Drinking between meals only:  yes  · Toughest challenge:  Weekends      Reports snacking less AM    HDL 60 (H) 07/24/2020 07:46 AM    TRIG 59 07/24/2020 07:46 AM    VLDL 12 07/24/2020 07:46 AM       Encounter Diagnosis(ses):   Encounter for therapeutic drug monitoring  (primary encounter diagnosis)  Mild hypercholesterolemia  Binge eating  Obesity minutes moderate exercise per week. 4. Improve sleep and stress.      Reviewed labs: 7/26/19  FBS, CBC in range. Renal function intact. 7/24/20- Vitamin D in range supplement daily OTC. Vitamin B 12 mildly high- on biotin- may repeat in the future. loss medication insurance coverage.      RTC 8 weeks.      Maria Luz Kinney, ISACC

## 2020-10-16 RX ORDER — TOPIRAMATE 25 MG/1
TABLET ORAL
Qty: 60 TABLET | Refills: 1 | Status: SHIPPED | OUTPATIENT
Start: 2020-10-16 | End: 2020-11-12

## 2020-10-20 ENCOUNTER — TELEPHONE (OUTPATIENT)
Dept: OBGYN CLINIC | Facility: CLINIC | Age: 40
End: 2020-10-20

## 2020-10-30 RX ORDER — BUPROPION HYDROCHLORIDE 150 MG/1
TABLET ORAL
Qty: 30 TABLET | Refills: 1 | Status: SHIPPED | OUTPATIENT
Start: 2020-10-30 | End: 2020-11-30

## 2020-11-04 ENCOUNTER — TELEPHONE (OUTPATIENT)
Dept: OBGYN CLINIC | Facility: CLINIC | Age: 40
End: 2020-11-04

## 2020-11-04 DIAGNOSIS — Z92.89 HISTORY OF DIAGNOSTIC MAMMOGRAPHY: Primary | ICD-10-CM

## 2020-11-04 NOTE — TELEPHONE ENCOUNTER
Disc found & reports printed. Per u/s, send all to scanning. Per MES order mammo w/ u/s. Left detailed vm for pt advising that disc was found, reports printed, mammo ordered &  Phone # to schedule.

## 2020-11-04 NOTE — TELEPHONE ENCOUNTER
Please call this patient her mammogram from 3/20/19 recommended 6 month f/u of a left breast mammo and u/s if she has not completed please order.   If she has completed can she send the report

## 2020-11-04 NOTE — TELEPHONE ENCOUNTER
Pt states she spoke w/ [place] provider & disc was sent. Advised I will check & get back to her. Pt does not want to delay completing f/u mammo as it is overdue by a year. Emphasized importance of completing even if previous results are unavailable.  Pt states i

## 2020-11-12 RX ORDER — TOPIRAMATE 25 MG/1
TABLET ORAL
Qty: 60 TABLET | Refills: 1 | Status: SHIPPED | OUTPATIENT
Start: 2020-11-12 | End: 2020-12-10

## 2020-11-30 RX ORDER — BUPROPION HYDROCHLORIDE 150 MG/1
150 TABLET ORAL DAILY
Qty: 30 TABLET | Refills: 0 | Status: SHIPPED | OUTPATIENT
Start: 2020-11-30 | End: 2020-12-04

## 2020-12-04 ENCOUNTER — OFFICE VISIT (OUTPATIENT)
Dept: SURGERY | Facility: CLINIC | Age: 40
End: 2020-12-04
Payer: COMMERCIAL

## 2020-12-04 VITALS
OXYGEN SATURATION: 99 % | DIASTOLIC BLOOD PRESSURE: 80 MMHG | HEIGHT: 66.5 IN | SYSTOLIC BLOOD PRESSURE: 133 MMHG | WEIGHT: 222 LBS | BODY MASS INDEX: 35.26 KG/M2 | HEART RATE: 73 BPM

## 2020-12-04 VITALS — HEIGHT: 66.5 IN | BODY MASS INDEX: 35.38 KG/M2 | WEIGHT: 222.81 LBS

## 2020-12-04 DIAGNOSIS — F17.210 CIGARETTE SMOKER: ICD-10-CM

## 2020-12-04 DIAGNOSIS — E78.00 MILD HYPERCHOLESTEROLEMIA: ICD-10-CM

## 2020-12-04 DIAGNOSIS — R63.5 WEIGHT GAIN: ICD-10-CM

## 2020-12-04 DIAGNOSIS — R63.2 BINGE EATING: ICD-10-CM

## 2020-12-04 DIAGNOSIS — E66.09 CLASS 2 OBESITY DUE TO EXCESS CALORIES WITHOUT SERIOUS COMORBIDITY WITH BODY MASS INDEX (BMI) OF 35.0 TO 35.9 IN ADULT: Primary | ICD-10-CM

## 2020-12-04 DIAGNOSIS — Z51.81 ENCOUNTER FOR THERAPEUTIC DRUG MONITORING: Primary | ICD-10-CM

## 2020-12-04 DIAGNOSIS — Z86.59 HISTORY OF ANXIETY: ICD-10-CM

## 2020-12-04 DIAGNOSIS — E66.9 OBESITY (BMI 30-39.9): ICD-10-CM

## 2020-12-04 PROCEDURE — 99214 OFFICE O/P EST MOD 30 MIN: CPT | Performed by: NURSE PRACTITIONER

## 2020-12-04 PROCEDURE — 3075F SYST BP GE 130 - 139MM HG: CPT | Performed by: NURSE PRACTITIONER

## 2020-12-04 PROCEDURE — 97803 MED NUTRITION INDIV SUBSEQ: CPT | Performed by: DIETITIAN, REGISTERED

## 2020-12-04 PROCEDURE — 3008F BODY MASS INDEX DOCD: CPT | Performed by: NURSE PRACTITIONER

## 2020-12-04 PROCEDURE — 3079F DIAST BP 80-89 MM HG: CPT | Performed by: NURSE PRACTITIONER

## 2020-12-04 PROCEDURE — 3008F BODY MASS INDEX DOCD: CPT | Performed by: DIETITIAN, REGISTERED

## 2020-12-04 RX ORDER — BUPROPION HYDROCHLORIDE 150 MG/1
150 TABLET ORAL DAILY
Qty: 30 TABLET | Refills: 2 | Status: SHIPPED | OUTPATIENT
Start: 2020-12-27 | End: 2021-03-15

## 2020-12-04 NOTE — PATIENT INSTRUCTIONS
Goal: track foods on weekends. Check on insurance coverage for weight loss medications/dietican:    Qsymia, Contrave, Saxenda, Phentermine, Vyvanse, and dietician benefits. Write personal SMART goals.

## 2020-12-04 NOTE — PROGRESS NOTES
Patient:  Mau Duncan  :      1980  MRN:      [id number]    Chief Complaint:  Weight Managment    SUBJECTIVE     History of Present Illness:  [address] [place] [address] [place] is being seen today for a follow-up for medically supported weight loss.      Followed Medications:    Current Outpatient Medications   Medication Sig Dispense Refill   • [START ON 12/27/2020] buPROPion HCl ER, XL, 150 MG Oral Tablet 24 Hr Take 1 tablet (150 mg total) by mouth daily.  30 tablet 2   • TOPIRAMATE 25 MG Oral Tab TAKE 1 TABLET BY Forced sexual activity: Not on file    Other Topics      Concerns:        Not on file    Social History Narrative      Not on file    Surgical History:  History reviewed. No pertinent surgical history. Family History:  History reviewed.  No pertinent fam day  · Length of time it takes to consume a meal:    · # of snacks per day: None   · Type of snacks:    · Amount of soda consumption per day:  2 cans soda/week   · Amount of water (in ounces) per day:  64 oz water/day  · Drinking between meals only:  yes Skin color, texture, turgor normal. No rashes or lesions  Neurologic: Grossly normal    ASSESSMENT     HYPERCHOLESTEROLEMIA:  Mild elevation.     Lab Results   Component Value Date/Time    CHOLEST 211 (H) 07/24/2020 07:46 AM     (H) 07/24/2020 07:46 at least 150 minutes of moderate physical activity per week.    Reviewed the importance of whole food, plant powered, minimally to non-processed diet rich in fruits, vegetables, whole grains and healthy fats, and meats/seafood without hormones, steroids, or    Consider hypnotherapy for smoking cessation and weight loss.      Additional Goals:   Nutrition:   -Healthy Plate method for dinners (in progress). -Aim for 2 fruits/day. Avocado, tomato, berries, oranges, apples, zenaida/limes (meeting goal).    -Aim

## 2020-12-04 NOTE — PROGRESS NOTES
FOLLOW UP NUTRITION CONSULTATION      Nutrition Assessment    Number of consultations with dietitian: 2    Height:  Ht Readings from Last 1 Encounters:  10/09/20 : 5' 6.5\" (1.689 m)      Weight:   Wt Readings from Last 2 Encounters:  12/04/20 : 222 lb exercise      Monitoring/Evaluation: {Follow up appt scheduled with dietitian        Kathy Vanessa, MS, RD, LDN

## 2020-12-08 ENCOUNTER — HOSPITAL ENCOUNTER (OUTPATIENT)
Dept: ULTRASOUND IMAGING | Facility: HOSPITAL | Age: 40
Discharge: HOME | End: 2020-12-08
Attending: ADVANCED PRACTICE MIDWIFE
Payer: COMMERCIAL

## 2020-12-08 ENCOUNTER — HOSPITAL ENCOUNTER (OUTPATIENT)
Dept: MAMMOGRAPHY | Facility: HOSPITAL | Age: 40
Discharge: HOME | End: 2020-12-08
Attending: ADVANCED PRACTICE MIDWIFE
Payer: COMMERCIAL

## 2020-12-08 DIAGNOSIS — Z92.89 HISTORY OF DIAGNOSTIC MAMMOGRAPHY: ICD-10-CM

## 2020-12-08 PROCEDURE — 77066 DX MAMMO INCL CAD BI: CPT | Performed by: ADVANCED PRACTICE MIDWIFE

## 2020-12-08 PROCEDURE — 76642 ULTRASOUND BREAST LIMITED: CPT | Performed by: ADVANCED PRACTICE MIDWIFE

## 2020-12-08 PROCEDURE — 77062 BREAST TOMOSYNTHESIS BI: CPT | Performed by: ADVANCED PRACTICE MIDWIFE

## 2020-12-10 RX ORDER — TOPIRAMATE 25 MG/1
TABLET ORAL
Qty: 60 TABLET | Refills: 1 | Status: SHIPPED | OUTPATIENT
Start: 2020-12-10 | End: 2021-03-12

## 2021-02-27 DIAGNOSIS — F41.9 ANXIETY: ICD-10-CM

## 2021-02-27 RX ORDER — DESVENLAFAXINE 50 MG/1
TABLET, EXTENDED RELEASE ORAL
Qty: 90 TABLET | Refills: 1 | Status: SHIPPED | OUTPATIENT
Start: 2021-02-27 | End: 2021-08-26

## 2021-03-12 ENCOUNTER — OFFICE VISIT (OUTPATIENT)
Dept: SURGERY | Facility: CLINIC | Age: 41
End: 2021-03-12
Payer: COMMERCIAL

## 2021-03-12 VITALS
BODY MASS INDEX: 36.32 KG/M2 | SYSTOLIC BLOOD PRESSURE: 122 MMHG | DIASTOLIC BLOOD PRESSURE: 64 MMHG | HEART RATE: 79 BPM | WEIGHT: 228.69 LBS | HEIGHT: 66.5 IN | OXYGEN SATURATION: 100 %

## 2021-03-12 DIAGNOSIS — E66.9 OBESITY (BMI 30-39.9): ICD-10-CM

## 2021-03-12 DIAGNOSIS — Z86.59 HISTORY OF ANXIETY: ICD-10-CM

## 2021-03-12 DIAGNOSIS — E78.00 MILD HYPERCHOLESTEROLEMIA: ICD-10-CM

## 2021-03-12 DIAGNOSIS — F17.210 CIGARETTE SMOKER: ICD-10-CM

## 2021-03-12 DIAGNOSIS — Z51.81 ENCOUNTER FOR THERAPEUTIC DRUG MONITORING: Primary | ICD-10-CM

## 2021-03-12 PROCEDURE — 3074F SYST BP LT 130 MM HG: CPT | Performed by: NURSE PRACTITIONER

## 2021-03-12 PROCEDURE — 3008F BODY MASS INDEX DOCD: CPT | Performed by: NURSE PRACTITIONER

## 2021-03-12 PROCEDURE — 99214 OFFICE O/P EST MOD 30 MIN: CPT | Performed by: NURSE PRACTITIONER

## 2021-03-12 PROCEDURE — 3078F DIAST BP <80 MM HG: CPT | Performed by: NURSE PRACTITIONER

## 2021-03-12 NOTE — PROGRESS NOTES
Patient:  Maite Wild  :      1980  MRN:      [id number]    Chief Complaint:  Weight Managment    SUBJECTIVE     History of Present Illness:  Jessica Zuñiga is being seen today for a follow-up for medically supported weight loss.      Patient r oz (99.4 kg)      Patient Medications:    Current Outpatient Medications   Medication Sig Dispense Refill   • DESVENLAFAXINE SUCCINATE ER 50 MG Oral Tablet 24 Hr TAKE 1 TABLET BY MOUTH EVERY DAY 90 tablet 1   • TOPIRAMATE 25 MG Oral Tab TAKE 1 TABLET BY MO   Marital Status:   Intimate Partner Violence:       Fear of Current or Ex-Partner:       Emotionally Abused:       Physically Abused:       Sexually Abused:   Surgical History:  History reviewed. No pertinent surgical history.   Family History:  History re 3 meal(s) per day  · Length of time it takes to consume a meal:    · # of snacks per day: None   · Type of snacks:    · Amount of soda consumption per day:  2 cans soda/week   · Amount of water (in ounces) per day:  64 oz water/day  · Drinking between meal symmetric  Skin: Skin color, texture, turgor normal. No rashes or lesions  Neurologic: Grossly normal    ASSESSMENT     HYPERCHOLESTEROLEMIA:  Mild elevation.     Lab Results   Component Value Date/Time    CHOLEST 211 (H) 07/24/2020 07:46 AM     (H) flour, sugar.     Goals for next month:  1. Keep a food log, aim for 100 grams carbs/day. Follow up with RD.   2. Drink 64 ounces of non-caloric beverages per day. No fruit juices or regular soda. 3. Aim for 150 minutes moderate exercise per week.      4.

## 2021-03-12 NOTE — PATIENT INSTRUCTIONS
[employer]    Continue Wellbutrin 150 mg daily- pcp to take over ordering.      Continue to hold 15 mg phentermine in the AM- daily marijuana use. Taper topiramate.

## 2021-03-15 DIAGNOSIS — Z51.81 ENCOUNTER FOR THERAPEUTIC DRUG MONITORING: ICD-10-CM

## 2021-03-15 DIAGNOSIS — R63.5 WEIGHT GAIN: ICD-10-CM

## 2021-03-15 DIAGNOSIS — E66.9 OBESITY (BMI 30-39.9): ICD-10-CM

## 2021-03-15 RX ORDER — BUPROPION HYDROCHLORIDE 150 MG/1
150 TABLET ORAL DAILY
Qty: 30 TABLET | Refills: 0 | Status: SHIPPED | OUTPATIENT
Start: 2021-03-15 | End: 2021-04-08

## 2021-04-08 DIAGNOSIS — Z51.81 ENCOUNTER FOR THERAPEUTIC DRUG MONITORING: ICD-10-CM

## 2021-04-08 DIAGNOSIS — E66.9 OBESITY (BMI 30-39.9): ICD-10-CM

## 2021-04-08 DIAGNOSIS — R63.5 WEIGHT GAIN: ICD-10-CM

## 2021-04-08 RX ORDER — BUPROPION HYDROCHLORIDE 150 MG/1
TABLET ORAL
Qty: 30 TABLET | Refills: 0 | Status: SHIPPED | OUTPATIENT
Start: 2021-04-08 | End: 2021-05-10

## 2021-05-08 DIAGNOSIS — Z51.81 ENCOUNTER FOR THERAPEUTIC DRUG MONITORING: ICD-10-CM

## 2021-05-08 DIAGNOSIS — E66.9 OBESITY (BMI 30-39.9): ICD-10-CM

## 2021-05-08 DIAGNOSIS — R63.5 WEIGHT GAIN: ICD-10-CM

## 2021-05-10 RX ORDER — BUPROPION HYDROCHLORIDE 150 MG/1
150 TABLET ORAL DAILY
Qty: 14 TABLET | Refills: 0 | Status: SHIPPED | OUTPATIENT
Start: 2021-05-10 | End: 2021-06-28

## 2021-06-10 ENCOUNTER — TELEPHONE (OUTPATIENT)
Dept: OBGYN CLINIC | Facility: CLINIC | Age: 41
End: 2021-06-10

## 2021-06-10 DIAGNOSIS — Z12.31 BREAST CANCER SCREENING BY MAMMOGRAM: Primary | ICD-10-CM

## 2021-06-10 NOTE — TELEPHONE ENCOUNTER
Sent patient a [employer] message. She is due for a 6 month f/u diagnostic bilateral mammogram and right breast ultrasound. Orders placed.

## 2021-06-24 DIAGNOSIS — R63.5 WEIGHT GAIN: ICD-10-CM

## 2021-06-24 DIAGNOSIS — Z51.81 ENCOUNTER FOR THERAPEUTIC DRUG MONITORING: ICD-10-CM

## 2021-06-24 DIAGNOSIS — E66.9 OBESITY (BMI 30-39.9): ICD-10-CM

## 2021-06-24 RX ORDER — BUPROPION HYDROCHLORIDE 150 MG/1
TABLET ORAL
Qty: 14 TABLET | Refills: 0 | OUTPATIENT
Start: 2021-06-24

## 2021-06-26 DIAGNOSIS — R63.5 WEIGHT GAIN: ICD-10-CM

## 2021-06-26 DIAGNOSIS — Z51.81 ENCOUNTER FOR THERAPEUTIC DRUG MONITORING: ICD-10-CM

## 2021-06-26 DIAGNOSIS — E66.9 OBESITY (BMI 30-39.9): ICD-10-CM

## 2021-06-28 DIAGNOSIS — Z51.81 ENCOUNTER FOR THERAPEUTIC DRUG MONITORING: ICD-10-CM

## 2021-06-28 DIAGNOSIS — E66.9 OBESITY (BMI 30-39.9): ICD-10-CM

## 2021-06-28 DIAGNOSIS — R63.5 WEIGHT GAIN: ICD-10-CM

## 2021-06-28 RX ORDER — BUPROPION HYDROCHLORIDE 150 MG/1
TABLET ORAL
Qty: 14 TABLET | Refills: 0 | OUTPATIENT
Start: 2021-06-28

## 2021-06-28 RX ORDER — BUPROPION HYDROCHLORIDE 150 MG/1
150 TABLET ORAL DAILY
Qty: 30 TABLET | Refills: 0 | Status: SHIPPED | OUTPATIENT
Start: 2021-06-28 | End: 2021-07-21

## 2021-07-15 ENCOUNTER — TELEPHONE (OUTPATIENT)
Dept: OBGYN CLINIC | Facility: CLINIC | Age: 41
End: 2021-07-15

## 2021-07-15 ENCOUNTER — HOSPITAL ENCOUNTER (OUTPATIENT)
Dept: ULTRASOUND IMAGING | Facility: HOSPITAL | Age: 41
Discharge: HOME | End: 2021-07-15
Attending: ADVANCED PRACTICE MIDWIFE
Payer: COMMERCIAL

## 2021-07-15 ENCOUNTER — HOSPITAL ENCOUNTER (OUTPATIENT)
Dept: MAMMOGRAPHY | Facility: HOSPITAL | Age: 41
Discharge: HOME | End: 2021-07-15
Attending: ADVANCED PRACTICE MIDWIFE
Payer: COMMERCIAL

## 2021-07-15 DIAGNOSIS — Z12.31 BREAST CANCER SCREENING BY MAMMOGRAM: ICD-10-CM

## 2021-07-15 DIAGNOSIS — Z12.31 BREAST CANCER SCREENING BY MAMMOGRAM: Primary | ICD-10-CM

## 2021-07-15 PROCEDURE — 77062 BREAST TOMOSYNTHESIS BI: CPT | Performed by: ADVANCED PRACTICE MIDWIFE

## 2021-07-15 PROCEDURE — 77066 DX MAMMO INCL CAD BI: CPT | Performed by: ADVANCED PRACTICE MIDWIFE

## 2021-07-15 PROCEDURE — 76642 ULTRASOUND BREAST LIMITED: CPT | Performed by: ADVANCED PRACTICE MIDWIFE

## 2021-07-20 DIAGNOSIS — R63.5 WEIGHT GAIN: ICD-10-CM

## 2021-07-20 DIAGNOSIS — E66.9 OBESITY (BMI 30-39.9): ICD-10-CM

## 2021-07-20 DIAGNOSIS — Z51.81 ENCOUNTER FOR THERAPEUTIC DRUG MONITORING: ICD-10-CM

## 2021-07-21 RX ORDER — BUPROPION HYDROCHLORIDE 150 MG/1
150 TABLET ORAL DAILY
Qty: 14 TABLET | Refills: 0 | Status: SHIPPED | OUTPATIENT
Start: 2021-07-21 | End: 2021-08-04

## 2021-07-28 ENCOUNTER — OFFICE VISIT (OUTPATIENT)
Dept: INTERNAL MEDICINE CLINIC | Facility: CLINIC | Age: 41
End: 2021-07-28
Payer: COMMERCIAL

## 2021-07-28 VITALS
DIASTOLIC BLOOD PRESSURE: 88 MMHG | HEART RATE: 85 BPM | BODY MASS INDEX: 36.16 KG/M2 | WEIGHT: 225 LBS | HEIGHT: 66 IN | SYSTOLIC BLOOD PRESSURE: 143 MMHG

## 2021-07-28 DIAGNOSIS — F17.210 CIGARETTE NICOTINE DEPENDENCE WITHOUT COMPLICATION: ICD-10-CM

## 2021-07-28 DIAGNOSIS — R92.8 ABNORMAL MAMMOGRAM: ICD-10-CM

## 2021-07-28 DIAGNOSIS — F41.9 ANXIETY: ICD-10-CM

## 2021-07-28 DIAGNOSIS — E78.00 MILD HYPERCHOLESTEROLEMIA: ICD-10-CM

## 2021-07-28 DIAGNOSIS — R49.0 HOARSE VOICE QUALITY: ICD-10-CM

## 2021-07-28 DIAGNOSIS — Z00.00 ANNUAL PHYSICAL EXAM: Primary | ICD-10-CM

## 2021-07-28 PROBLEM — Z51.81 ENCOUNTER FOR THERAPEUTIC DRUG MONITORING: Status: RESOLVED | Noted: 2020-08-28 | Resolved: 2021-07-28

## 2021-07-28 PROBLEM — Z86.59 HISTORY OF ANXIETY: Status: RESOLVED | Noted: 2020-06-25 | Resolved: 2021-07-28

## 2021-07-28 PROBLEM — R63.5 WEIGHT GAIN: Status: RESOLVED | Noted: 2020-06-25 | Resolved: 2021-07-28

## 2021-07-28 PROCEDURE — 3077F SYST BP >= 140 MM HG: CPT | Performed by: INTERNAL MEDICINE

## 2021-07-28 PROCEDURE — 3008F BODY MASS INDEX DOCD: CPT | Performed by: INTERNAL MEDICINE

## 2021-07-28 PROCEDURE — 3079F DIAST BP 80-89 MM HG: CPT | Performed by: INTERNAL MEDICINE

## 2021-07-28 PROCEDURE — 99406 BEHAV CHNG SMOKING 3-10 MIN: CPT | Performed by: INTERNAL MEDICINE

## 2021-07-28 PROCEDURE — 99396 PREV VISIT EST AGE 40-64: CPT | Performed by: INTERNAL MEDICINE

## 2021-07-28 PROCEDURE — 99213 OFFICE O/P EST LOW 20 MIN: CPT | Performed by: INTERNAL MEDICINE

## 2021-07-28 RX ORDER — DESVENLAFAXINE 25 MG/1
25 TABLET, EXTENDED RELEASE ORAL DAILY
Qty: 30 TABLET | Refills: 0 | Status: SHIPPED | OUTPATIENT
Start: 2021-07-28 | End: 2021-08-23

## 2021-07-28 NOTE — PATIENT INSTRUCTIONS
Prevention Guidelines, Women Ages 25 to 44  Screening tests and vaccines are an important part of managing your health. A screening test is done to find possible disorders or diseases in people who don't have any symptoms.  The goal is to find a disease e diabetes Lifelong testing every 3 years   Type 2 diabetes All women with prediabetes Every year   Gonorrhea Sexually active women at increased risk for infection At routine exams   Hepatitis C Anyone at increased risk At routine exams   HIV All women [place] Meningococcal Women at increased risk for infection should talk with their healthcare provider 1 or more doses   Pneumococcal conjugate vaccine (PCV13) and pneumococcal polysaccharide vaccine (PPSV23) Women at increased risk for infection should talk wit Smoking    Quitting smoking is the most important step you can take to improve your health. We're glad you have set a goal to improve your health. Quit Smoking Resources    In addition to medications, use the STAR plan to help you successfully quit.    ·

## 2021-07-28 NOTE — PROGRESS NOTES
Patient ID: Mau Duncan is a 35year old female. Patient presents with:  Physical       HISTORY OF PRESENT ILLNESS:   HPI  Patient presents for above.   Here for annual physical and to discuss other medical issues.     She is a longstanding history of Outpatient Medications:   •  desvenlafaxine ER 25 MG Oral Tablet 24 Hr, Take 1 tablet (25 mg total) by mouth daily. , Disp: 30 tablet, Rfl: 0  •  buPROPion HCl ER, XL, 150 MG Oral Tablet 24 Hr, Take 1 tablet (150 mg total) by mouth daily. , Disp: 14 tablet,     Fear of Current or Ex-Partner:       Emotionally Abused:       Physically Abused:       Sexually Abused:         PHYSICAL EXAM:      07/28/21  1108   BP: 143/88   Pulse: 85   Weight: 225 lb (102.1 kg)   Height: 5' 6\" (1.676 m)       Body mass index ASSESSMENT/PLAN:   1. Annual physical exam  · CBC WITH DIFFERENTIAL WITH PLATELET; Future  · COMP METABOLIC PANEL (14); Future  · LIPID PANEL; Future  · TSH W REFLEX TO FREE T4; Future  · Preventative guidelines discussed and handout given.     2. Mild hy collaboratively selected appropriate treatment goals and methods based on the patient’s interest in and willingness to change the behavior.    Assist: We used behavior change techniques (self-help and/or counseling), to aid Rocio Scott in achieving agreed-upon

## 2021-08-02 DIAGNOSIS — Z51.81 ENCOUNTER FOR THERAPEUTIC DRUG MONITORING: ICD-10-CM

## 2021-08-02 DIAGNOSIS — R63.5 WEIGHT GAIN: ICD-10-CM

## 2021-08-02 DIAGNOSIS — E66.9 OBESITY (BMI 30-39.9): ICD-10-CM

## 2021-08-02 NOTE — TELEPHONE ENCOUNTER
Current Outpatient Medications:   •  buPROPion HCl ER, XL, 150 MG Oral Tablet 24 Hr, Take 1 tablet (150 mg total) by mouth daily. , Disp: 14 tablet, Rfl: 0

## 2021-08-03 ENCOUNTER — OFFICE VISIT (OUTPATIENT)
Dept: OTOLARYNGOLOGY | Facility: CLINIC | Age: 41
End: 2021-08-03
Payer: COMMERCIAL

## 2021-08-03 VITALS — WEIGHT: 225 LBS | HEIGHT: 66 IN | BODY MASS INDEX: 36.16 KG/M2 | TEMPERATURE: 97 F

## 2021-08-03 DIAGNOSIS — R49.0 DYSPHONIA: Primary | ICD-10-CM

## 2021-08-03 PROCEDURE — 31575 DIAGNOSTIC LARYNGOSCOPY: CPT | Performed by: OTOLARYNGOLOGY

## 2021-08-03 PROCEDURE — 99243 OFF/OP CNSLTJ NEW/EST LOW 30: CPT | Performed by: OTOLARYNGOLOGY

## 2021-08-03 PROCEDURE — 3008F BODY MASS INDEX DOCD: CPT | Performed by: OTOLARYNGOLOGY

## 2021-08-03 RX ORDER — BUPROPION HYDROCHLORIDE 150 MG/1
150 TABLET ORAL DAILY
Qty: 14 TABLET | Refills: 0 | OUTPATIENT
Start: 2021-08-03

## 2021-08-03 RX ORDER — METHYLPREDNISOLONE 4 MG/1
TABLET ORAL
Qty: 21 TABLET | Refills: 0 | Status: SHIPPED | OUTPATIENT
Start: 2021-08-03 | End: 2021-11-23

## 2021-08-03 RX ORDER — MONTELUKAST SODIUM 10 MG/1
10 TABLET ORAL NIGHTLY
Qty: 30 TABLET | Refills: 3 | Status: SHIPPED | OUTPATIENT
Start: 2021-08-03 | End: 2021-10-23

## 2021-08-03 RX ORDER — AZELASTINE 1 MG/ML
2 SPRAY, METERED NASAL 2 TIMES DAILY
Qty: 30 ML | Refills: 0 | Status: SHIPPED | OUTPATIENT
Start: 2021-08-03 | End: 2021-08-25

## 2021-08-03 RX ORDER — PANTOPRAZOLE SODIUM 40 MG/1
40 TABLET, DELAYED RELEASE ORAL DAILY
Qty: 30 TABLET | Refills: 3 | Status: SHIPPED | OUTPATIENT
Start: 2021-08-03 | End: 2021-10-23

## 2021-08-03 RX ORDER — LORATADINE 10 MG/1
10 TABLET ORAL DAILY
Qty: 30 TABLET | Refills: 3 | Status: SHIPPED | OUTPATIENT
Start: 2021-08-03 | End: 2021-10-23

## 2021-08-03 NOTE — PROGRESS NOTES
Dorothy Allred is a 35year old female. Patient presents with:  Throat Problem: Patient Presents with Hoarseness for 6 yrs       HISTORY OF PRESENT ILLNESS  She presents with a 6 to 7-year history of dysphonia.   She states that she was seen by another ENT bleeding and easy bruising.            PHYSICAL EXAM    Temp 97.1 °F (36.2 °C) (Tympanic)   Ht 5' 6\" (1.676 m)   Wt 225 lb (102.1 kg)   LMP 07/01/2021 (Exact Date)   BMI 36.32 kg/m²        Constitutional Normal Overall appearance - Normal.   Psychiatric No of the larynx was performed. Findings were as follows.        Hypopharynx/Larynx:  Epiglottis is normal.  Arytenoids:  Bilateral: Arytenoids are normal.  Vocal folds-false  Bilateral: Vocal folds (false) are normal.  Vocal folds-true  Bilateral: Vocal fol

## 2021-08-04 ENCOUNTER — PATIENT MESSAGE (OUTPATIENT)
Dept: INTERNAL MEDICINE CLINIC | Facility: CLINIC | Age: 41
End: 2021-08-04

## 2021-08-04 DIAGNOSIS — E66.9 OBESITY (BMI 30-39.9): ICD-10-CM

## 2021-08-04 DIAGNOSIS — R63.5 WEIGHT GAIN: ICD-10-CM

## 2021-08-04 DIAGNOSIS — Z51.81 ENCOUNTER FOR THERAPEUTIC DRUG MONITORING: ICD-10-CM

## 2021-08-04 NOTE — TELEPHONE ENCOUNTER
Dr. Solis Payment, please see patient message and advise. Medication pended. Thanks.     Future Appointments   Date Time Provider Ron Erwin   9/7/2021  8:00 AM Lenoard Kussmaul, MD [address] [facility] OF THE [facility]   7/28/2022 11:15 AM Lee Moreno MD [facility]

## 2021-08-04 NOTE — TELEPHONE ENCOUNTER
From: Ana Vicente  To: Magdaleno Casas MD  Sent: 8/4/2021 1:19 PM CDT  Subject: Prescription Question    Hello! I recently saw Dr. Dina Joaquin for my physical last week. I'd like to see if he would send a refill request to [facility] for my Bupropion?  I previously had

## 2021-08-05 RX ORDER — BUPROPION HYDROCHLORIDE 150 MG/1
150 TABLET ORAL DAILY
Qty: 14 TABLET | Refills: 0 | Status: SHIPPED | OUTPATIENT
Start: 2021-08-05 | End: 2021-08-20

## 2021-08-06 ENCOUNTER — LAB ENCOUNTER (OUTPATIENT)
Dept: LAB | Age: 41
End: 2021-08-06
Attending: INTERNAL MEDICINE
Payer: COMMERCIAL

## 2021-08-06 DIAGNOSIS — Z00.00 ANNUAL PHYSICAL EXAM: ICD-10-CM

## 2021-08-06 DIAGNOSIS — E78.00 MILD HYPERCHOLESTEROLEMIA: ICD-10-CM

## 2021-08-06 LAB
ALBUMIN SERPL-MCNC: 3.5 G/DL (ref 3.4–5)
ALBUMIN/GLOB SERPL: 0.9 {RATIO} (ref 1–2)
ALP LIVER SERPL-CCNC: 70 U/L
ALT SERPL-CCNC: 24 U/L
ANION GAP SERPL CALC-SCNC: 6 MMOL/L (ref 0–18)
AST SERPL-CCNC: 12 U/L (ref 15–37)
BASOPHILS # BLD AUTO: 0.03 X10(3) UL (ref 0–0.2)
BASOPHILS NFR BLD AUTO: 0.3 %
BILIRUB SERPL-MCNC: 0.5 MG/DL (ref 0.1–2)
BUN BLD-MCNC: 18 MG/DL (ref 7–18)
BUN/CREAT SERPL: 19.4 (ref 10–20)
CALCIUM BLD-MCNC: 9.1 MG/DL (ref 8.5–10.1)
CHLORIDE SERPL-SCNC: 105 MMOL/L (ref 98–112)
CHOLEST SMN-MCNC: 243 MG/DL (ref ?–200)
CO2 SERPL-SCNC: 27 MMOL/L (ref 21–32)
CREAT BLD-MCNC: 0.93 MG/DL
DEPRECATED RDW RBC AUTO: 49.1 FL (ref 35.1–46.3)
EOSINOPHIL # BLD AUTO: 0.01 X10(3) UL (ref 0–0.7)
EOSINOPHIL NFR BLD AUTO: 0.1 %
ERYTHROCYTE [DISTWIDTH] IN BLOOD BY AUTOMATED COUNT: 16.5 % (ref 11–15)
GLOBULIN PLAS-MCNC: 3.8 G/DL (ref 2.8–4.4)
GLUCOSE BLD-MCNC: 99 MG/DL (ref 70–99)
HCT VFR BLD AUTO: 35 %
HDLC SERPL-MCNC: 70 MG/DL (ref 40–59)
HGB BLD-MCNC: 11 G/DL
IMM GRANULOCYTES # BLD AUTO: 0.02 X10(3) UL (ref 0–1)
IMM GRANULOCYTES NFR BLD: 0.2 %
LDLC SERPL CALC-MCNC: 164 MG/DL (ref ?–100)
LYMPHOCYTES # BLD AUTO: 2.52 X10(3) UL (ref 1–4)
LYMPHOCYTES NFR BLD AUTO: 26.1 %
M PROTEIN MFR SERPL ELPH: 7.3 G/DL (ref 6.4–8.2)
MCH RBC QN AUTO: 25.7 PG (ref 26–34)
MCHC RBC AUTO-ENTMCNC: 31.4 G/DL (ref 31–37)
MCV RBC AUTO: 81.8 FL
MONOCYTES # BLD AUTO: 0.67 X10(3) UL (ref 0.1–1)
MONOCYTES NFR BLD AUTO: 6.9 %
NEUTROPHILS # BLD AUTO: 6.41 X10 (3) UL (ref 1.5–7.7)
NEUTROPHILS # BLD AUTO: 6.41 X10(3) UL (ref 1.5–7.7)
NEUTROPHILS NFR BLD AUTO: 66.4 %
NONHDLC SERPL-MCNC: 173 MG/DL (ref ?–130)
OSMOLALITY SERPL CALC.SUM OF ELEC: 288 MOSM/KG (ref 275–295)
PATIENT FASTING Y/N/NP: YES
PATIENT FASTING Y/N/NP: YES
PLATELET # BLD AUTO: 391 10(3)UL (ref 150–450)
POTASSIUM SERPL-SCNC: 4 MMOL/L (ref 3.5–5.1)
RBC # BLD AUTO: 4.28 X10(6)UL
SODIUM SERPL-SCNC: 138 MMOL/L (ref 136–145)
TRIGL SERPL-MCNC: 53 MG/DL (ref 30–149)
TSI SER-ACNC: 1.53 MIU/ML (ref 0.36–3.74)
VLDLC SERPL CALC-MCNC: 10 MG/DL (ref 0–30)
WBC # BLD AUTO: 9.7 X10(3) UL (ref 4–11)

## 2021-08-06 PROCEDURE — 85025 COMPLETE CBC W/AUTO DIFF WBC: CPT

## 2021-08-06 PROCEDURE — 36415 COLL VENOUS BLD VENIPUNCTURE: CPT

## 2021-08-06 PROCEDURE — 80053 COMPREHEN METABOLIC PANEL: CPT

## 2021-08-06 PROCEDURE — 80061 LIPID PANEL: CPT

## 2021-08-06 PROCEDURE — 84443 ASSAY THYROID STIM HORMONE: CPT

## 2021-08-09 ENCOUNTER — TELEPHONE (OUTPATIENT)
Dept: INTERNAL MEDICINE CLINIC | Facility: CLINIC | Age: 41
End: 2021-08-09

## 2021-08-09 DIAGNOSIS — N93.8 DUB (DYSFUNCTIONAL UTERINE BLEEDING): ICD-10-CM

## 2021-08-09 DIAGNOSIS — E78.00 MILD HYPERCHOLESTEROLEMIA: Primary | ICD-10-CM

## 2021-08-09 NOTE — TELEPHONE ENCOUNTER
Sent to Dr. Nighat Rosales    She answered your lab questions  Tammy Hanson,     Your labs are attached. Sonia Graft were a couple of abnormalities.  Your cholesterol levels are trending upwards. Nidia Kicks are quite a bit higher from last year. Kayce Matute do recommend you go on a medica

## 2021-08-09 NOTE — TELEPHONE ENCOUNTER
----- Message from Effie Duval sent at 8/9/2021  9:46 AM CDT -----  Regarding: Test Results Question  Contact: [phone]  Hi Dr. Carloz Murphy,    Thank you for the test results and summary.   In regards to the high cholesterol findings, I don't prefer to sta

## 2021-08-10 NOTE — TELEPHONE ENCOUNTER
We can recheck both in 3 months. I have placed the order for these. Have her do fasting for 8 hours.   Have her follow-up with me 1 to 2 days after she does the lab test.

## 2021-08-10 NOTE — TELEPHONE ENCOUNTER
\Spoke with pt,  verified  Pt informed of MD recommendation, pt stated understanding.       Future Appointments   Date Time Provider Ron Yaima   2021  8:00 AM Sonia Berg MD [address] [facility]   2022 11:15 AM Viri Brown MD [facility]

## 2021-08-20 DIAGNOSIS — Z51.81 ENCOUNTER FOR THERAPEUTIC DRUG MONITORING: ICD-10-CM

## 2021-08-20 DIAGNOSIS — R63.5 WEIGHT GAIN: ICD-10-CM

## 2021-08-20 DIAGNOSIS — E66.9 OBESITY (BMI 30-39.9): ICD-10-CM

## 2021-08-20 RX ORDER — BUPROPION HYDROCHLORIDE 150 MG/1
TABLET ORAL
Qty: 14 TABLET | Refills: 0 | Status: SHIPPED | OUTPATIENT
Start: 2021-08-20 | End: 2021-09-02

## 2021-08-20 RX ORDER — BUPROPION HYDROCHLORIDE 150 MG/1
150 TABLET ORAL DAILY
Qty: 14 TABLET | Refills: 0 | OUTPATIENT
Start: 2021-08-20

## 2021-08-20 NOTE — TELEPHONE ENCOUNTER
Upon chart review patient Rx passes protocol but    Wellbutrin 150 mg filled for 14 days    Desvenlafaixne ER 25 mg is still active    Desvenlafaxine ER 50 mg was is currently has a refill

## 2021-08-22 DIAGNOSIS — F41.9 ANXIETY: ICD-10-CM

## 2021-08-23 RX ORDER — DESVENLAFAXINE 25 MG/1
25 TABLET, EXTENDED RELEASE ORAL DAILY
Qty: 90 TABLET | Refills: 1 | Status: SHIPPED | OUTPATIENT
Start: 2021-08-23 | End: 2022-02-24

## 2021-08-23 NOTE — TELEPHONE ENCOUNTER
Refill passed per [address] [place] protocol. Requested Prescriptions   Pending Prescriptions Disp Refills    desvenlafaxine ER 25 MG Oral Tablet 24 Hr [Pharmacy Med Name: DESVENLAFAXINE SUCCNT ER 25 MG] 90 tablet 1     Sig: Take 1 tablet (25 mg total) by mouth daily.         Psychiatric Non-Scheduled (Anti-Anxiety) Passed - 8/22/2021 11:32 AM        Passed - Appointment in last 6 or next 3 months              @[facility]@      @[facility]@

## 2021-08-23 NOTE — TELEPHONE ENCOUNTER
Refill passed per [facility] protocol.      Requested Prescriptions   Pending Prescriptions Disp Refills    DESVENLAFAXINE ER 25 MG Oral Tablet 24 Hr [Pharmacy Med Name: DESVENLAFAXINE SUCCNT ER 25 MG] 30 tablet 0     Sig: TAKE 1 TABLET BY MOUTH EVERY DAY        Psychiatric Non-Scheduled (Anti-Anxiety) Passed - 8/22/2021 11:32 AM        Passed - Appointment in last 6 or next 3 months              @[facility]@      @[facility]@

## 2021-08-25 RX ORDER — AZELASTINE 1 MG/ML
2 SPRAY, METERED NASAL 2 TIMES DAILY
Qty: 30 ML | Refills: 0 | Status: SHIPPED | OUTPATIENT
Start: 2021-08-25 | End: 2021-11-23

## 2021-08-26 DIAGNOSIS — F41.9 ANXIETY: ICD-10-CM

## 2021-08-26 RX ORDER — DESVENLAFAXINE 50 MG/1
50 TABLET, EXTENDED RELEASE ORAL DAILY
Qty: 90 TABLET | Refills: 1 | Status: SHIPPED | OUTPATIENT
Start: 2021-08-26

## 2021-08-26 NOTE — TELEPHONE ENCOUNTER
Refill passed per [facility], [facility] protocol.   Requested Prescriptions   Pending Prescriptions Disp Refills    DESVENLAFAXINE ER 50 MG Oral Tablet 24 Hr [Pharmacy Med Name: DESVENLAFAXINE SUCCNT ER 50 MG] 90 tablet 1     Sig: TAKE 1 TABLET BY MOUTH EVERY DAY

## 2021-09-02 DIAGNOSIS — E66.9 OBESITY (BMI 30-39.9): ICD-10-CM

## 2021-09-02 DIAGNOSIS — R63.5 WEIGHT GAIN: ICD-10-CM

## 2021-09-02 DIAGNOSIS — Z51.81 ENCOUNTER FOR THERAPEUTIC DRUG MONITORING: ICD-10-CM

## 2021-09-02 RX ORDER — BUPROPION HYDROCHLORIDE 150 MG/1
150 TABLET ORAL DAILY
Qty: 90 TABLET | Refills: 1 | Status: SHIPPED | OUTPATIENT
Start: 2021-09-02 | End: 2022-02-24

## 2021-09-02 NOTE — TELEPHONE ENCOUNTER
Refill passed per [facility] [place], [facility] protocol.     Requested Prescriptions   Pending Prescriptions Disp Refills    BUPROPION 150 MG Oral Tablet 24 Hr [Pharmacy Med Name: BUPROPION HCL  MG TABLET] 14 tablet 0     Sig: TAKE 1 TABLET BY MOUTH EVERY DAY        Psychiatric Non-Scheduled (Anti-Anxiety) Passed - 9/2/2021 12:06 PM        Passed - Appointment in last 6 or next 3 months            Future Appointments         Provider Department Appt Notes    In 5 days Juancho Powers MD [facility], [address] thraot follow up and ears wax     In 10 months Mat Ware MD [place] [facility] [place], [facility], Willa Pardo          Recent Outpatient Visits              1 month ago [address] [place] [address], [address], Rosa Henriquez MD    Office Visit    1 month ago Annual physical exam    [facility], [facility], [address] [facility], Shaila Goodrich MD    Office Visit    5 months ago Encounter for therapeutic drug monitoring    [facility] Dewain Millin, APRN    Office Visit    9 months ago Class 2 obesity due to excess calories without serious comorbidity with body mass index (BMI) of 35.0 to 35.9 in adult    [address], [address], Alexandr Núñez [place]    Office Visit    9 months ago Encounter for therapeutic drug monitoring    [facility]alob[facility] Dewain Millin, APRN    Office Visit

## 2021-09-07 ENCOUNTER — OFFICE VISIT (OUTPATIENT)
Dept: OTOLARYNGOLOGY | Facility: CLINIC | Age: 41
End: 2021-09-07
Payer: COMMERCIAL

## 2021-09-07 VITALS — HEIGHT: 66 IN | WEIGHT: 225 LBS | BODY MASS INDEX: 36.16 KG/M2

## 2021-09-07 DIAGNOSIS — R49.0 DYSPHONIA: Primary | ICD-10-CM

## 2021-09-07 PROCEDURE — 31575 DIAGNOSTIC LARYNGOSCOPY: CPT | Performed by: OTOLARYNGOLOGY

## 2021-09-07 PROCEDURE — 99213 OFFICE O/P EST LOW 20 MIN: CPT | Performed by: OTOLARYNGOLOGY

## 2021-09-07 PROCEDURE — 3008F BODY MASS INDEX DOCD: CPT | Performed by: OTOLARYNGOLOGY

## 2021-09-08 ENCOUNTER — TELEPHONE (OUTPATIENT)
Dept: PHYSICAL THERAPY | Facility: HOSPITAL | Age: 41
End: 2021-09-08

## 2021-09-09 ENCOUNTER — OFFICE VISIT (OUTPATIENT)
Dept: SPEECH THERAPY | Age: 41
End: 2021-09-09
Attending: OTOLARYNGOLOGY
Payer: COMMERCIAL

## 2021-09-09 DIAGNOSIS — R49.0 DYSPHONIA: ICD-10-CM

## 2021-09-09 PROCEDURE — 92507 TX SP LANG VOICE COMM INDIV: CPT

## 2021-09-09 PROCEDURE — 92524 BEHAVRAL QUALIT ANALYS VOICE: CPT

## 2021-09-09 NOTE — PROGRESS NOTES
ADULT VOICE EVALUATION:   Referring Physician: Dr. Elaine Russ  Diagnosis: Hoarseness/Dysphonia R49.0      Date of Service: 9/9/2021     PATIENT Edd Corbin is a 35year old female who presents to therapy today with complaints of a hoarse vocal socially with family, friends, and community members    Gina De La Paz describes prior level of function worsening over the last 6+ years. Pt goals include wanting to talk without hoarse/raspy voice.      Shira Houston presents to speech therapy evaluation w Patient was instructed in and issued a HEP for: vocal hygiene changes and diaphragmatic breathing.      Charges: Eval x1, [address]      Total Timed Treatment: 60 min     Total Treatment Time: 60 min     PLAN OF CARE:    Goals: (to be met in 6 visits)   1) Me

## 2021-09-14 ENCOUNTER — OFFICE VISIT (OUTPATIENT)
Dept: SPEECH THERAPY | Age: 41
End: 2021-09-14
Attending: OTOLARYNGOLOGY
Payer: COMMERCIAL

## 2021-09-14 PROCEDURE — 92507 TX SP LANG VOICE COMM INDIV: CPT

## 2021-09-14 NOTE — PROGRESS NOTES
Diagnosis: hoarseness  R49.0     Precautions: none  Insurance Type (# Auth): BCBS PPO (30 visits)  Date POC Expires: 12/8/2021      Total Treatment time: 60 min  Charges: [id number]    Treatment Number: 2    Subjective: Patient answered screening questions to e

## 2021-09-23 NOTE — PROGRESS NOTES
Chen Lopez is a 35year old female. Patient presents with: Follow - Up: pt presents today for f/u on dysphonia. pt states sxs are still the same. HISTORY OF PRESENT ILLNESS    She presents with a 6 to 7-year history of dysphonia.   She states th pain, irregular heartbeat/palpitations and syncope. GI Negative Abdominal pain and diarrhea. Endocrine Negative Cold intolerance and heat intolerance. Neuro Negative Tremors. Psych Negative Anxiety and depression.    Integumentary Negative Frequent sprayed into the nose. Laryngoscopy:  Flexible Fiberoptic Laryngoscopy: A diagnostic flexible fiberoptic laryngoscopy was performed. The flexible fiberoptic laryngoscope was placed into the nose or mouthand advanced  into the interior of the larynx.  A t her speech therapy. - SPEECH THERAPY - INTERNAL        This note was prepared using [employer] [place] [employer] voice recognition dictation software. As a result errors may occur. When identified these errors have been corrected.  While every attempt is made to correct

## 2021-09-24 ENCOUNTER — OFFICE VISIT (OUTPATIENT)
Dept: SPEECH THERAPY | Age: 41
End: 2021-09-24
Attending: OTOLARYNGOLOGY
Payer: COMMERCIAL

## 2021-09-24 PROCEDURE — 92507 TX SP LANG VOICE COMM INDIV: CPT

## 2021-09-24 NOTE — PROGRESS NOTES
Diagnosis: hoarseness  R49.0     Precautions: none  Insurance Type (# Auth): BCBS PPO (30 visits)  Date POC Expires: 12/8/2021      Total Treatment time: 60 min  Charges: [id number]    Treatment Number: 3    Subjective: Patient answered screening questions to e

## 2021-09-30 ENCOUNTER — APPOINTMENT (OUTPATIENT)
Dept: SPEECH THERAPY | Age: 41
End: 2021-09-30
Attending: OTOLARYNGOLOGY
Payer: COMMERCIAL

## 2021-10-04 ENCOUNTER — OFFICE VISIT (OUTPATIENT)
Dept: SPEECH THERAPY | Age: 41
End: 2021-10-04
Attending: OTOLARYNGOLOGY
Payer: COMMERCIAL

## 2021-10-04 PROCEDURE — 92507 TX SP LANG VOICE COMM INDIV: CPT

## 2021-10-04 NOTE — PROGRESS NOTES
Diagnosis: hoarseness  R49.0     Precautions: none  Insurance Type (# Auth): BCBS PPO (30 visits)  Date POC Expires: 12/8/2021      Total Treatment time: 60 min  Charges: [id number]    Treatment Number: 4    Subjective: Patient answered screening questions to e

## 2021-10-15 ENCOUNTER — OFFICE VISIT (OUTPATIENT)
Dept: SPEECH THERAPY | Age: 41
End: 2021-10-15
Attending: OTOLARYNGOLOGY
Payer: COMMERCIAL

## 2021-10-15 PROCEDURE — 92507 TX SP LANG VOICE COMM INDIV: CPT

## 2021-10-15 NOTE — PROGRESS NOTES
Discharge Summary    Diagnosis: hoarseness  R49.0     Precautions: none  Insurance Type (# Auth): BCBS PPO (30 visits)  Date POC Expires: 12/8/2021      Total Treatment time: 60 min  Charges: [id number]    Treatment Number: 5    Subjective: Patient answered scr

## 2021-10-23 RX ORDER — LORATADINE 10 MG/1
TABLET ORAL
Qty: 90 TABLET | Refills: 0 | Status: SHIPPED | OUTPATIENT
Start: 2021-10-23 | End: 2021-11-23

## 2021-10-23 RX ORDER — PANTOPRAZOLE SODIUM 40 MG/1
TABLET, DELAYED RELEASE ORAL
Qty: 90 TABLET | Refills: 0 | Status: SHIPPED | OUTPATIENT
Start: 2021-10-23 | End: 2021-11-23

## 2021-10-23 RX ORDER — MONTELUKAST SODIUM 10 MG/1
TABLET ORAL
Qty: 90 TABLET | Refills: 0 | Status: SHIPPED | OUTPATIENT
Start: 2021-10-23 | End: 2021-11-23

## 2021-11-05 ENCOUNTER — LAB ENCOUNTER (OUTPATIENT)
Dept: LAB | Age: 41
End: 2021-11-05
Attending: INTERNAL MEDICINE
Payer: COMMERCIAL

## 2021-11-05 DIAGNOSIS — E78.00 MILD HYPERCHOLESTEROLEMIA: ICD-10-CM

## 2021-11-05 DIAGNOSIS — Z20.822 ENCOUNTER FOR SCREENING LABORATORY TESTING FOR COVID-19 VIRUS: ICD-10-CM

## 2021-11-05 DIAGNOSIS — N93.8 DUB (DYSFUNCTIONAL UTERINE BLEEDING): ICD-10-CM

## 2021-11-05 PROCEDURE — 80061 LIPID PANEL: CPT

## 2021-11-05 PROCEDURE — 36415 COLL VENOUS BLD VENIPUNCTURE: CPT

## 2021-11-05 PROCEDURE — 85025 COMPLETE CBC W/AUTO DIFF WBC: CPT

## 2021-11-05 PROCEDURE — 86769 SARS-COV-2 COVID-19 ANTIBODY: CPT

## 2021-11-23 ENCOUNTER — OFFICE VISIT (OUTPATIENT)
Dept: FAMILY MEDICINE CLINIC | Facility: CLINIC | Age: 41
End: 2021-11-23
Payer: COMMERCIAL

## 2021-11-23 VITALS
DIASTOLIC BLOOD PRESSURE: 82 MMHG | OXYGEN SATURATION: 97 % | SYSTOLIC BLOOD PRESSURE: 144 MMHG | HEIGHT: 66 IN | TEMPERATURE: 98 F | RESPIRATION RATE: 20 BRPM | BODY MASS INDEX: 35.36 KG/M2 | WEIGHT: 220 LBS | HEART RATE: 102 BPM

## 2021-11-23 DIAGNOSIS — B34.9 VIRAL ILLNESS: Primary | ICD-10-CM

## 2021-11-23 DIAGNOSIS — Z11.52 ENCOUNTER FOR SCREENING FOR COVID-19: ICD-10-CM

## 2021-11-23 DIAGNOSIS — R03.0 ELEVATED BLOOD PRESSURE READING: ICD-10-CM

## 2021-11-23 PROCEDURE — 3079F DIAST BP 80-89 MM HG: CPT | Performed by: NURSE PRACTITIONER

## 2021-11-23 PROCEDURE — 3077F SYST BP >= 140 MM HG: CPT | Performed by: NURSE PRACTITIONER

## 2021-11-23 PROCEDURE — 3008F BODY MASS INDEX DOCD: CPT | Performed by: NURSE PRACTITIONER

## 2021-11-23 PROCEDURE — 99213 OFFICE O/P EST LOW 20 MIN: CPT | Performed by: NURSE PRACTITIONER

## 2021-11-23 NOTE — PROGRESS NOTES
CHIEF COMPLAINT:   Patient presents with:  Sinus Problem: sinus pressure on and off, nose congestion, cough with phlegm, body aches x1 wk not vaccinated, no known exposure to covid per pt      HPI:   Ana Vicente is a 35year old female who presents with Past Medical History:   Diagnosis Date   • Anxiety       History reviewed. No pertinent surgical history.       Social History    Tobacco Use      Smoking status: Current Every Day Smoker        Packs/day: 0.50        Types: Cigarettes      Smokeless toba Visit:  Requested Prescriptions      No prescriptions requested or ordered in this encounter       PLAN:      Comfort care discussed. May try OTC flonase as directed on packaging. Discussed with patient that their blood pressure is elevated.  Risks of u Instructions       Viral Upper Respiratory Illness (Adult)    You have a viral upper respiratory illness (URI), which is another term for the common cold. This illness is contagious during the first few days.  It is spread through the air by coughing and sn (Note: Don't use decongestants if you have high blood pressure.)  Follow-up care  Follow up with your healthcare provider, or as advised.   When to seek medical advice  Call your healthcare provider right away if any of these occur:  · Cough with lots of co help. But they can cause side effects. These include drowsiness and drying of the eyes, nose, and mouth. Soothe a sore throat and cough  · Gargle every  2 hours with  1/4 teaspoon of salt dissolved in  1/2 cup of warm water.  Suck on throat lozenges and [facility]. All rights reserved. This information is not intended as a substitute for professional medical care. Always follow your healthcare professional's instructions.             The patient/parent indicates understanding of these issues and

## 2021-11-26 ENCOUNTER — TELEPHONE (OUTPATIENT)
Dept: FAMILY MEDICINE CLINIC | Facility: CLINIC | Age: 41
End: 2021-11-26

## 2021-11-26 ENCOUNTER — NURSE ONLY (OUTPATIENT)
Dept: INFUSION CENTER | Age: 41
End: 2021-11-26
Attending: NURSE PRACTITIONER
Payer: COMMERCIAL

## 2021-11-26 VITALS
SYSTOLIC BLOOD PRESSURE: 125 MMHG | BODY MASS INDEX: 36 KG/M2 | DIASTOLIC BLOOD PRESSURE: 72 MMHG | TEMPERATURE: 98 F | RESPIRATION RATE: 20 BRPM | HEART RATE: 88 BPM | WEIGHT: 220 LBS | OXYGEN SATURATION: 98 %

## 2021-11-26 DIAGNOSIS — U07.1 COVID-19 VIRUS INFECTION: Primary | ICD-10-CM

## 2021-11-26 NOTE — PROGRESS NOTES
1553- Infusion complete. No adverse reactions noted at this time. 1639-Patient denies any distress. Patient discharged to home in good condition.

## 2021-11-26 NOTE — TELEPHONE ENCOUNTER
Spoke with patient over the phone. Reports her symptoms are worsening. Moderate dry cough and HA. Feels like she can't stand for long periods of time. Mild sob. Denies difficulty breathing or chest pain. Unknown if fever. Speaking in full sentences.  Taking

## 2021-11-29 ENCOUNTER — TELEPHONE (OUTPATIENT)
Dept: CASE MANAGEMENT | Age: 41
End: 2021-11-29

## 2021-11-29 NOTE — TELEPHONE ENCOUNTER
Home Monitoring Day 1 post MAB infusion, no order for home monitoring. Symptoms started on 11/17/21, was tested positive  on 11/23 and received the PAB on 11/26/21. Will call her back tomorrow for symptoms follow up =nursing judgement.        What  wa

## 2021-11-29 NOTE — TELEPHONE ENCOUNTER
Pt received PAB infusion at [facility]  on 11/26/21 for COVID-19. Please follow-up with pt for post-infusion assessment and home monitoring if needed. Thank you.

## 2021-11-30 NOTE — TELEPHONE ENCOUNTER
Spoke with patient, states that her shortness of breath definitely improved, her cough is improved but still \"nag\".   Informed that we will no longer going to call her back, but she can call us back for any questions or concerns,ER for shortness of breath

## 2022-02-24 RX ORDER — DESVENLAFAXINE 50 MG/1
TABLET, EXTENDED RELEASE ORAL
Qty: 90 TABLET | Refills: 1 | Status: SHIPPED | OUTPATIENT
Start: 2022-02-24

## 2022-02-24 RX ORDER — BUPROPION HYDROCHLORIDE 150 MG/1
TABLET ORAL
Qty: 90 TABLET | Refills: 1 | Status: SHIPPED | OUTPATIENT
Start: 2022-02-24

## 2022-02-24 RX ORDER — DESVENLAFAXINE 25 MG/1
TABLET, EXTENDED RELEASE ORAL
Qty: 90 TABLET | Refills: 1 | Status: SHIPPED | OUTPATIENT
Start: 2022-02-24

## 2022-02-24 NOTE — TELEPHONE ENCOUNTER
Please review. Protocol failed or does not have a protocol.      Requested Prescriptions   Pending Prescriptions Disp Refills    DESVENLAFAXINE 25 MG Oral Tablet 24 Hr [Pharmacy Med Name: DESVENLAFAXINE SUCCNT ER 25 MG] 90 tablet 1     Sig: TAKE 1 TABLET BY MOUTH EVERY DAY        Psychiatric Non-Scheduled (Anti-Anxiety) Failed - 2/23/2022  3:34 AM        Failed - Appointment in last 6 or next 3 months           DESVENLAFAXINE ER 50 MG Oral Tablet 24 Hr [Pharmacy Med Name: DESVENLAFAXINE SUCCNT ER 50 MG] 90 tablet 1     Sig: TAKE 1 TABLET BY MOUTH EVERY DAY        Psychiatric Non-Scheduled (Anti-Anxiety) Failed - 2/23/2022  3:34 AM        Failed - Appointment in last 6 or next 3 months           BUPROPION  MG Oral Tablet 24 Hr [Pharmacy Med Name: BUPROPION HCL  MG TABLET] 90 tablet 1     Sig: TAKE 1 TABLET BY MOUTH EVERY DAY        Psychiatric Non-Scheduled (Anti-Anxiety) Failed - 2/23/2022  3:34 AM        Failed - Appointment in last 6 or next 3 months            Future Appointments         Provider Department Appt Notes    In 5 months Alissa Cancer, [address], [address] [facility]          Recent Outpatient Visits              2 months ago     [facility])    Nurse Only    3 months ago Viral illness    Gauri., ANDREW Yost    Office Visit    4 months ago     [address] [place] [address] in [address]    Office Visit    4 months ago     [address] [place] [address] in [address]    Office Visit    5 months ago     [address] [place] [address] in [facility], Frandy Vasquez [place]    Office Visit

## 2022-07-28 ENCOUNTER — LAB ENCOUNTER (OUTPATIENT)
Dept: LAB | Age: 42
End: 2022-07-28
Attending: INTERNAL MEDICINE
Payer: COMMERCIAL

## 2022-07-28 ENCOUNTER — OFFICE VISIT (OUTPATIENT)
Dept: INTERNAL MEDICINE CLINIC | Facility: CLINIC | Age: 42
End: 2022-07-28
Payer: COMMERCIAL

## 2022-07-28 VITALS
BODY MASS INDEX: 35.07 KG/M2 | DIASTOLIC BLOOD PRESSURE: 80 MMHG | SYSTOLIC BLOOD PRESSURE: 137 MMHG | WEIGHT: 218.19 LBS | HEIGHT: 66 IN | HEART RATE: 75 BPM

## 2022-07-28 DIAGNOSIS — F41.9 ANXIETY: ICD-10-CM

## 2022-07-28 DIAGNOSIS — E78.00 MILD HYPERCHOLESTEROLEMIA: ICD-10-CM

## 2022-07-28 DIAGNOSIS — F17.210 CIGARETTE NICOTINE DEPENDENCE WITHOUT COMPLICATION: ICD-10-CM

## 2022-07-28 DIAGNOSIS — R92.8 ABNORMAL MAMMOGRAM: ICD-10-CM

## 2022-07-28 DIAGNOSIS — Z00.00 ANNUAL PHYSICAL EXAM: ICD-10-CM

## 2022-07-28 DIAGNOSIS — Z00.00 ANNUAL PHYSICAL EXAM: Primary | ICD-10-CM

## 2022-07-28 LAB
ALBUMIN SERPL-MCNC: 3.7 G/DL (ref 3.4–5)
ALBUMIN/GLOB SERPL: 0.9 {RATIO} (ref 1–2)
ALP LIVER SERPL-CCNC: 75 U/L
ALT SERPL-CCNC: 18 U/L
ANION GAP SERPL CALC-SCNC: 4 MMOL/L (ref 0–18)
AST SERPL-CCNC: 14 U/L (ref 15–37)
BASOPHILS # BLD AUTO: 0.01 X10(3) UL (ref 0–0.2)
BASOPHILS NFR BLD AUTO: 0.2 %
BILIRUB SERPL-MCNC: 0.5 MG/DL (ref 0.1–2)
BUN BLD-MCNC: 18 MG/DL (ref 7–18)
BUN/CREAT SERPL: 17.6 (ref 10–20)
CALCIUM BLD-MCNC: 9.4 MG/DL (ref 8.5–10.1)
CHLORIDE SERPL-SCNC: 107 MMOL/L (ref 98–112)
CHOLEST SERPL-MCNC: 209 MG/DL (ref ?–200)
CO2 SERPL-SCNC: 27 MMOL/L (ref 21–32)
CREAT BLD-MCNC: 1.02 MG/DL
DEPRECATED RDW RBC AUTO: 50.7 FL (ref 35.1–46.3)
EOSINOPHIL # BLD AUTO: 0.01 X10(3) UL (ref 0–0.7)
EOSINOPHIL NFR BLD AUTO: 0.2 %
ERYTHROCYTE [DISTWIDTH] IN BLOOD BY AUTOMATED COUNT: 15.9 % (ref 11–15)
FASTING PATIENT LIPID ANSWER: NO
FASTING STATUS PATIENT QL REPORTED: NO
GLOBULIN PLAS-MCNC: 3.9 G/DL (ref 2.8–4.4)
GLUCOSE BLD-MCNC: 85 MG/DL (ref 70–99)
HCT VFR BLD AUTO: 41.3 %
HDLC SERPL-MCNC: 63 MG/DL (ref 40–59)
HGB BLD-MCNC: 12.8 G/DL
IMM GRANULOCYTES # BLD AUTO: 0.01 X10(3) UL (ref 0–1)
IMM GRANULOCYTES NFR BLD: 0.2 %
LDLC SERPL CALC-MCNC: 131 MG/DL (ref ?–100)
LYMPHOCYTES # BLD AUTO: 2.17 X10(3) UL (ref 1–4)
LYMPHOCYTES NFR BLD AUTO: 33 %
MCH RBC QN AUTO: 26.8 PG (ref 26–34)
MCHC RBC AUTO-ENTMCNC: 31 G/DL (ref 31–37)
MCV RBC AUTO: 86.6 FL
MONOCYTES # BLD AUTO: 0.52 X10(3) UL (ref 0.1–1)
MONOCYTES NFR BLD AUTO: 7.9 %
NEUTROPHILS # BLD AUTO: 3.85 X10 (3) UL (ref 1.5–7.7)
NEUTROPHILS # BLD AUTO: 3.85 X10(3) UL (ref 1.5–7.7)
NEUTROPHILS NFR BLD AUTO: 58.5 %
NONHDLC SERPL-MCNC: 146 MG/DL (ref ?–130)
OSMOLALITY SERPL CALC.SUM OF ELEC: 287 MOSM/KG (ref 275–295)
PLATELET # BLD AUTO: 334 10(3)UL (ref 150–450)
POTASSIUM SERPL-SCNC: 4.3 MMOL/L (ref 3.5–5.1)
PROT SERPL-MCNC: 7.6 G/DL (ref 6.4–8.2)
RBC # BLD AUTO: 4.77 X10(6)UL
SODIUM SERPL-SCNC: 138 MMOL/L (ref 136–145)
TRIGL SERPL-MCNC: 82 MG/DL (ref 30–149)
TSI SER-ACNC: 1.9 MIU/ML (ref 0.36–3.74)
VLDLC SERPL CALC-MCNC: 15 MG/DL (ref 0–30)
WBC # BLD AUTO: 6.6 X10(3) UL (ref 4–11)

## 2022-07-28 PROCEDURE — 36415 COLL VENOUS BLD VENIPUNCTURE: CPT

## 2022-07-28 PROCEDURE — 99396 PREV VISIT EST AGE 40-64: CPT | Performed by: INTERNAL MEDICINE

## 2022-07-28 PROCEDURE — 3079F DIAST BP 80-89 MM HG: CPT | Performed by: INTERNAL MEDICINE

## 2022-07-28 PROCEDURE — 3008F BODY MASS INDEX DOCD: CPT | Performed by: INTERNAL MEDICINE

## 2022-07-28 PROCEDURE — 80061 LIPID PANEL: CPT

## 2022-07-28 PROCEDURE — 84443 ASSAY THYROID STIM HORMONE: CPT

## 2022-07-28 PROCEDURE — 80053 COMPREHEN METABOLIC PANEL: CPT

## 2022-07-28 PROCEDURE — 85025 COMPLETE CBC W/AUTO DIFF WBC: CPT

## 2022-07-28 PROCEDURE — 3075F SYST BP GE 130 - 139MM HG: CPT | Performed by: INTERNAL MEDICINE

## 2022-08-21 DIAGNOSIS — Z51.81 ENCOUNTER FOR THERAPEUTIC DRUG MONITORING: ICD-10-CM

## 2022-08-21 DIAGNOSIS — R63.5 WEIGHT GAIN: ICD-10-CM

## 2022-08-21 DIAGNOSIS — E66.9 OBESITY (BMI 30-39.9): ICD-10-CM

## 2022-08-21 DIAGNOSIS — F41.9 ANXIETY: ICD-10-CM

## 2022-08-22 RX ORDER — BUPROPION HYDROCHLORIDE 150 MG/1
TABLET ORAL
Qty: 90 TABLET | Refills: 1 | Status: SHIPPED | OUTPATIENT
Start: 2022-08-22

## 2022-08-22 RX ORDER — DESVENLAFAXINE 50 MG/1
TABLET, EXTENDED RELEASE ORAL
Qty: 90 TABLET | Refills: 1 | Status: SHIPPED | OUTPATIENT
Start: 2022-08-22

## 2022-08-22 RX ORDER — DESVENLAFAXINE 25 MG/1
TABLET, EXTENDED RELEASE ORAL
Qty: 90 TABLET | Refills: 1 | Status: SHIPPED | OUTPATIENT
Start: 2022-08-22

## 2022-09-07 ENCOUNTER — HOSPITAL ENCOUNTER (OUTPATIENT)
Dept: MAMMOGRAPHY | Facility: HOSPITAL | Age: 42
Discharge: HOME | End: 2022-09-07
Attending: INTERNAL MEDICINE
Payer: COMMERCIAL

## 2022-09-07 DIAGNOSIS — R92.8 ABNORMAL MAMMOGRAM: ICD-10-CM

## 2022-09-07 DIAGNOSIS — Z00.00 ANNUAL PHYSICAL EXAM: ICD-10-CM

## 2022-09-07 PROCEDURE — 77063 BREAST TOMOSYNTHESIS BI: CPT | Performed by: INTERNAL MEDICINE

## 2022-09-07 PROCEDURE — 77067 SCR MAMMO BI INCL CAD: CPT | Performed by: INTERNAL MEDICINE

## 2022-09-20 ENCOUNTER — OFFICE VISIT (OUTPATIENT)
Dept: OBGYN CLINIC | Facility: CLINIC | Age: 42
End: 2022-09-20

## 2022-09-20 VITALS
SYSTOLIC BLOOD PRESSURE: 134 MMHG | WEIGHT: 221 LBS | DIASTOLIC BLOOD PRESSURE: 82 MMHG | BODY MASS INDEX: 35.52 KG/M2 | HEART RATE: 82 BPM | HEIGHT: 66 IN

## 2022-09-20 DIAGNOSIS — Z13.1 SCREENING FOR DIABETES MELLITUS: ICD-10-CM

## 2022-09-20 DIAGNOSIS — N84.1 MUCOUS POLYP OF CERVIX: ICD-10-CM

## 2022-09-20 DIAGNOSIS — Z01.419 WOMEN'S ANNUAL ROUTINE GYNECOLOGICAL EXAMINATION: Primary | ICD-10-CM

## 2022-09-20 PROCEDURE — 3008F BODY MASS INDEX DOCD: CPT | Performed by: ADVANCED PRACTICE MIDWIFE

## 2022-09-20 PROCEDURE — 3075F SYST BP GE 130 - 139MM HG: CPT | Performed by: ADVANCED PRACTICE MIDWIFE

## 2022-09-20 PROCEDURE — 3079F DIAST BP 80-89 MM HG: CPT | Performed by: ADVANCED PRACTICE MIDWIFE

## 2022-09-20 PROCEDURE — 57500 BIOPSY OF CERVIX: CPT | Performed by: ADVANCED PRACTICE MIDWIFE

## 2022-09-20 PROCEDURE — 99386 PREV VISIT NEW AGE 40-64: CPT | Performed by: ADVANCED PRACTICE MIDWIFE

## 2022-09-20 NOTE — PROCEDURES
Polypectomy     Pregnancy Results: negative from urine test   Birth control method(s) used:   condom    Consent signed. Procedure discussed with the patient in detail including indication, risks, benefits, alternatives and complications. Pelvic Exam Findings:  Cervix: polyp    Procedure:  Speculum placed in the vagina. Betadine wash of cervix. Polyp grasped with ring clamp and twisted off base. Silver nitrate & Monsels used to achieve hemostasis. Good hemostasis noted. Patient tolerated procedure well. Visit Plan:  Specimen sent to pathology. Pelvic rest for 1 week. Discharge instructions reviewed with patient.

## 2022-09-21 ENCOUNTER — TELEPHONE (OUTPATIENT)
Dept: OBGYN CLINIC | Facility: CLINIC | Age: 42
End: 2022-09-21

## 2022-09-21 NOTE — TELEPHONE ENCOUNTER
----- Message from Mira Rangel CNM sent at 9/21/2022  3:11 PM CDT -----  Pathology showed cervical polyp No follow up needed

## 2022-10-21 ENCOUNTER — TELEPHONE (OUTPATIENT)
Dept: OBGYN CLINIC | Facility: CLINIC | Age: 42
End: 2022-10-21

## 2022-10-21 NOTE — TELEPHONE ENCOUNTER
Kathern Cushing an  from [employer] currently working on an appeal, needs authorization to use another procedure code.  please advise

## 2022-10-25 ENCOUNTER — LAB ENCOUNTER (OUTPATIENT)
Dept: LAB | Age: 42
End: 2022-10-25
Attending: ADVANCED PRACTICE MIDWIFE
Payer: COMMERCIAL

## 2022-10-25 ENCOUNTER — OFFICE VISIT (OUTPATIENT)
Dept: INTERNAL MEDICINE CLINIC | Facility: CLINIC | Age: 42
End: 2022-10-25
Payer: COMMERCIAL

## 2022-10-25 ENCOUNTER — TELEPHONE (OUTPATIENT)
Dept: OBGYN CLINIC | Facility: CLINIC | Age: 42
End: 2022-10-25

## 2022-10-25 VITALS
SYSTOLIC BLOOD PRESSURE: 119 MMHG | DIASTOLIC BLOOD PRESSURE: 78 MMHG | HEIGHT: 66 IN | BODY MASS INDEX: 35.84 KG/M2 | HEART RATE: 73 BPM | WEIGHT: 223 LBS

## 2022-10-25 DIAGNOSIS — R59.1 LYMPHADENOPATHY: Primary | ICD-10-CM

## 2022-10-25 DIAGNOSIS — Z13.1 SCREENING FOR DIABETES MELLITUS: ICD-10-CM

## 2022-10-25 LAB
EST. AVERAGE GLUCOSE BLD GHB EST-MCNC: 105 MG/DL (ref 68–126)
HBA1C MFR BLD: 5.3 % (ref ?–5.7)

## 2022-10-25 PROCEDURE — 3074F SYST BP LT 130 MM HG: CPT | Performed by: INTERNAL MEDICINE

## 2022-10-25 PROCEDURE — 3008F BODY MASS INDEX DOCD: CPT | Performed by: INTERNAL MEDICINE

## 2022-10-25 PROCEDURE — 3078F DIAST BP <80 MM HG: CPT | Performed by: INTERNAL MEDICINE

## 2022-10-25 PROCEDURE — 83036 HEMOGLOBIN GLYCOSYLATED A1C: CPT

## 2022-10-25 PROCEDURE — 36415 COLL VENOUS BLD VENIPUNCTURE: CPT

## 2022-10-25 PROCEDURE — 99213 OFFICE O/P EST LOW 20 MIN: CPT | Performed by: INTERNAL MEDICINE

## 2022-10-25 NOTE — TELEPHONE ENCOUNTER
Services for mammogram done on 7/15/21. Cortney Guan is asking for authorization to change diagnosis code from Z-Code to [id number] abnormal inconclusive on diagnostic. The Z-codes are not paying. Facility claim is using all Z-codes. Radiologist is using R490. Appeal has a deadline ending soon, where the pt will soon be responsible to pay out of pocket.     Please advise  [employer] Hours 9am to 5pm

## 2022-10-28 NOTE — TELEPHONE ENCOUNTER
Lmtcb. Awaiting coding change on our end.  If they can change the code on their end it might be faster

## 2022-10-28 NOTE — TELEPHONE ENCOUNTER
Shaniqua Warren, nurse, [facility] indicates all they are waiting for is permission to adjust code from [phone] to D186. Please call at [phone], thanks.

## 2023-06-23 ENCOUNTER — OFFICE VISIT (OUTPATIENT)
Dept: DERMATOLOGY CLINIC | Facility: CLINIC | Age: 43
End: 2023-06-23

## 2023-06-23 DIAGNOSIS — Z12.83 SCREENING EXAM FOR SKIN CANCER: Primary | ICD-10-CM

## 2023-06-23 DIAGNOSIS — D22.9 MULTIPLE NEVI: ICD-10-CM

## 2023-06-23 PROCEDURE — 99203 OFFICE O/P NEW LOW 30 MIN: CPT | Performed by: PHYSICIAN ASSISTANT

## 2023-06-23 NOTE — PROGRESS NOTES
HPI:    Patient ID: Barbarann Gowers is a 37year old female. Patient presents for full body skin exam. Has had moles removed in the past which were benign. She does try to wear sun screen. Denies personal or family hx of skin cancer. Lesion on head that was once large, but is now reduced in size. No draining or tenderness noted. No allergies to medications noted. Review of Systems   Constitutional: Negative for chills and fever. Musculoskeletal: Negative for arthralgias and myalgias. Skin: Positive for rash. Negative for color change and wound. No current outpatient medications on file. Allergies:No Known Allergies   LMP 10/18/2022 (Approximate)   There is no height or weight on file to calculate BMI. PHYSICAL EXAM:   Physical Exam  Constitutional:       General: She is not in acute distress. Appearance: Normal appearance. Skin:     General: Skin is warm and dry. Findings: Rash present. Comments: Multiple nevi noted throughout the body. Some are macular and papular. No draining or tenderness noted. Neurological:      Mental Status: She is alert and oriented to person, place, and time. ASSESSMENT/PLAN:   1. Screening exam for skin cancer  -After discussion with patient, advised the following:  Reassurance regarding other benign skin lesions. Signs and symptoms of skin cancer, ABCDE's of melanoma discussed with patient. Sunscreen use, sun protection, self exams reviewed. Followup as noted RTC ---routine checkup 6 mos -one year or p.r.n.   -Pt was agreeable to plan and will comply with discussion above. 2. Multiple nevi  -After discussion with patient, advised the following:  -Benign lesions  -Watch for now  -Return if there are changes.   -To call or follow-up with worsening symptoms or concerns.   -Pt was agreeable to plan and will comply with discussion above. No orders of the defined types were placed in this encounter.       Meds This Visit:  Requested Prescriptions      No prescriptions requested or ordered in this encounter       Imaging & Referrals:  None         ID#[id number]

## 2023-07-28 ENCOUNTER — OFFICE VISIT (OUTPATIENT)
Dept: INTERNAL MEDICINE CLINIC | Facility: CLINIC | Age: 43
End: 2023-07-28

## 2023-07-28 ENCOUNTER — LAB ENCOUNTER (OUTPATIENT)
Dept: LAB | Age: 43
End: 2023-07-28
Attending: INTERNAL MEDICINE
Payer: COMMERCIAL

## 2023-07-28 VITALS
OXYGEN SATURATION: 100 % | SYSTOLIC BLOOD PRESSURE: 126 MMHG | WEIGHT: 218 LBS | HEIGHT: 66 IN | HEART RATE: 79 BPM | DIASTOLIC BLOOD PRESSURE: 92 MMHG | BODY MASS INDEX: 35.03 KG/M2

## 2023-07-28 DIAGNOSIS — E78.00 MILD HYPERCHOLESTEROLEMIA: ICD-10-CM

## 2023-07-28 DIAGNOSIS — F41.9 ANXIETY: ICD-10-CM

## 2023-07-28 DIAGNOSIS — F17.210 CIGARETTE NICOTINE DEPENDENCE WITHOUT COMPLICATION: ICD-10-CM

## 2023-07-28 DIAGNOSIS — Z12.11 COLON CANCER SCREENING: ICD-10-CM

## 2023-07-28 DIAGNOSIS — Z00.00 ANNUAL PHYSICAL EXAM: ICD-10-CM

## 2023-07-28 DIAGNOSIS — Z00.00 ANNUAL PHYSICAL EXAM: Primary | ICD-10-CM

## 2023-07-28 DIAGNOSIS — E66.9 OBESITY (BMI 30-39.9): ICD-10-CM

## 2023-07-28 LAB
ALBUMIN SERPL-MCNC: 3.6 G/DL (ref 3.4–5)
ALBUMIN/GLOB SERPL: 1 {RATIO} (ref 1–2)
ALP LIVER SERPL-CCNC: 61 U/L
ALT SERPL-CCNC: 17 U/L
ANION GAP SERPL CALC-SCNC: 4 MMOL/L (ref 0–18)
AST SERPL-CCNC: 15 U/L (ref 15–37)
BASOPHILS # BLD AUTO: 0.02 X10(3) UL (ref 0–0.2)
BASOPHILS NFR BLD AUTO: 0.3 %
BILIRUB SERPL-MCNC: 0.5 MG/DL (ref 0.1–2)
BUN BLD-MCNC: 20 MG/DL (ref 7–18)
BUN/CREAT SERPL: 19.2 (ref 10–20)
CALCIUM BLD-MCNC: 9.5 MG/DL (ref 8.5–10.1)
CHLORIDE SERPL-SCNC: 106 MMOL/L (ref 98–112)
CHOLEST SERPL-MCNC: 207 MG/DL (ref ?–200)
CO2 SERPL-SCNC: 28 MMOL/L (ref 21–32)
CREAT BLD-MCNC: 1.04 MG/DL
DEPRECATED RDW RBC AUTO: 43.7 FL (ref 35.1–46.3)
EGFRCR SERPLBLD CKD-EPI 2021: 68 ML/MIN/1.73M2 (ref 60–?)
EOSINOPHIL # BLD AUTO: 0.02 X10(3) UL (ref 0–0.7)
EOSINOPHIL NFR BLD AUTO: 0.3 %
ERYTHROCYTE [DISTWIDTH] IN BLOOD BY AUTOMATED COUNT: 14.8 % (ref 11–15)
FASTING PATIENT LIPID ANSWER: NO
FASTING STATUS PATIENT QL REPORTED: NO
GLOBULIN PLAS-MCNC: 3.7 G/DL (ref 2.8–4.4)
GLUCOSE BLD-MCNC: 90 MG/DL (ref 70–99)
HCT VFR BLD AUTO: 36.6 %
HDLC SERPL-MCNC: 58 MG/DL (ref 40–59)
HGB BLD-MCNC: 11.8 G/DL
IMM GRANULOCYTES # BLD AUTO: 0.01 X10(3) UL (ref 0–1)
IMM GRANULOCYTES NFR BLD: 0.1 %
LDLC SERPL CALC-MCNC: 134 MG/DL (ref ?–100)
LYMPHOCYTES # BLD AUTO: 2.36 X10(3) UL (ref 1–4)
LYMPHOCYTES NFR BLD AUTO: 31.6 %
MCH RBC QN AUTO: 26 PG (ref 26–34)
MCHC RBC AUTO-ENTMCNC: 32.2 G/DL (ref 31–37)
MCV RBC AUTO: 80.6 FL
MONOCYTES # BLD AUTO: 0.66 X10(3) UL (ref 0.1–1)
MONOCYTES NFR BLD AUTO: 8.8 %
NEUTROPHILS # BLD AUTO: 4.4 X10 (3) UL (ref 1.5–7.7)
NEUTROPHILS # BLD AUTO: 4.4 X10(3) UL (ref 1.5–7.7)
NEUTROPHILS NFR BLD AUTO: 58.9 %
NONHDLC SERPL-MCNC: 149 MG/DL (ref ?–130)
OSMOLALITY SERPL CALC.SUM OF ELEC: 288 MOSM/KG (ref 275–295)
PLATELET # BLD AUTO: 339 10(3)UL (ref 150–450)
POTASSIUM SERPL-SCNC: 4.1 MMOL/L (ref 3.5–5.1)
PROT SERPL-MCNC: 7.3 G/DL (ref 6.4–8.2)
RBC # BLD AUTO: 4.54 X10(6)UL
SODIUM SERPL-SCNC: 138 MMOL/L (ref 136–145)
TRIGL SERPL-MCNC: 84 MG/DL (ref 30–149)
TSI SER-ACNC: 1.72 MIU/ML (ref 0.36–3.74)
VLDLC SERPL CALC-MCNC: 15 MG/DL (ref 0–30)
WBC # BLD AUTO: 7.5 X10(3) UL (ref 4–11)

## 2023-07-28 PROCEDURE — 85025 COMPLETE CBC W/AUTO DIFF WBC: CPT

## 2023-07-28 PROCEDURE — 99406 BEHAV CHNG SMOKING 3-10 MIN: CPT | Performed by: INTERNAL MEDICINE

## 2023-07-28 PROCEDURE — 84443 ASSAY THYROID STIM HORMONE: CPT

## 2023-07-28 PROCEDURE — 36415 COLL VENOUS BLD VENIPUNCTURE: CPT

## 2023-07-28 PROCEDURE — 99396 PREV VISIT EST AGE 40-64: CPT | Performed by: INTERNAL MEDICINE

## 2023-07-28 PROCEDURE — 3080F DIAST BP >= 90 MM HG: CPT | Performed by: INTERNAL MEDICINE

## 2023-07-28 PROCEDURE — 80061 LIPID PANEL: CPT

## 2023-07-28 PROCEDURE — 3008F BODY MASS INDEX DOCD: CPT | Performed by: INTERNAL MEDICINE

## 2023-07-28 PROCEDURE — 80053 COMPREHEN METABOLIC PANEL: CPT

## 2023-07-28 PROCEDURE — 3074F SYST BP LT 130 MM HG: CPT | Performed by: INTERNAL MEDICINE

## 2023-12-27 ENCOUNTER — OFFICE VISIT (OUTPATIENT)
Dept: INTERNAL MEDICINE CLINIC | Facility: CLINIC | Age: 43
End: 2023-12-27
Payer: COMMERCIAL

## 2023-12-27 VITALS
HEART RATE: 76 BPM | RESPIRATION RATE: 14 BRPM | DIASTOLIC BLOOD PRESSURE: 80 MMHG | BODY MASS INDEX: 35.2 KG/M2 | SYSTOLIC BLOOD PRESSURE: 120 MMHG | OXYGEN SATURATION: 100 % | HEIGHT: 66 IN | WEIGHT: 219 LBS

## 2023-12-27 DIAGNOSIS — R09.81 NASAL CONGESTION: Primary | ICD-10-CM

## 2023-12-27 DIAGNOSIS — J34.89 RHINORRHEA: ICD-10-CM

## 2023-12-27 PROCEDURE — 99213 OFFICE O/P EST LOW 20 MIN: CPT | Performed by: INTERNAL MEDICINE

## 2023-12-27 PROCEDURE — 3074F SYST BP LT 130 MM HG: CPT | Performed by: INTERNAL MEDICINE

## 2023-12-27 PROCEDURE — 3008F BODY MASS INDEX DOCD: CPT | Performed by: INTERNAL MEDICINE

## 2023-12-27 PROCEDURE — 3079F DIAST BP 80-89 MM HG: CPT | Performed by: INTERNAL MEDICINE

## 2024-05-09 ENCOUNTER — TELEPHONE (OUTPATIENT)
Dept: FAMILY MEDICINE CLINIC | Facility: CLINIC | Age: 44
End: 2024-05-09

## 2024-05-09 DIAGNOSIS — Z12.11 ENCOUNTER FOR SCREENING COLONOSCOPY: Primary | ICD-10-CM

## 2024-05-14 ENCOUNTER — OFFICE VISIT (OUTPATIENT)
Dept: OBGYN CLINIC | Facility: CLINIC | Age: 44
End: 2024-05-14

## 2024-05-14 VITALS
BODY MASS INDEX: 35.58 KG/M2 | SYSTOLIC BLOOD PRESSURE: 124 MMHG | HEART RATE: 80 BPM | DIASTOLIC BLOOD PRESSURE: 86 MMHG | WEIGHT: 221.38 LBS | HEIGHT: 66 IN

## 2024-05-14 DIAGNOSIS — Z01.419 WOMEN'S ANNUAL ROUTINE GYNECOLOGICAL EXAMINATION: Primary | ICD-10-CM

## 2024-05-14 DIAGNOSIS — Z12.4 PAPANICOLAOU SMEAR FOR CERVICAL CANCER SCREENING: ICD-10-CM

## 2024-05-14 DIAGNOSIS — N84.1 CERVICAL POLYP: ICD-10-CM

## 2024-05-14 DIAGNOSIS — N92.3 INTERMENSTRUAL SPOTTING: ICD-10-CM

## 2024-05-14 DIAGNOSIS — N84.1 MUCOUS POLYP OF CERVIX: ICD-10-CM

## 2024-05-14 PROCEDURE — 88305 TISSUE EXAM BY PATHOLOGIST: CPT | Performed by: ADVANCED PRACTICE MIDWIFE

## 2024-05-14 PROCEDURE — 88175 CYTOPATH C/V AUTO FLUID REDO: CPT | Performed by: ADVANCED PRACTICE MIDWIFE

## 2024-05-14 NOTE — PROCEDURES
Polypectomy       Consent signed.  Procedure discussed with the patient in detail including indication, risks, benefits, alternatives and complications.    Pelvic Exam Findings:  Cervix: polyp    Procedure:  Speculum placed in the vagina.  Betadine wash of cervix.  Polyp grasped with Ring forcep and twisted off base.  Good hemostasis noted.  Patient tolerated procedure well.    Visit Plan:  Specimen sent to pathology.  Pelvic rest for 1 week.  Discharge instructions reviewed with patient.

## 2024-05-14 NOTE — PROGRESS NOTES
Jyoti Fitch is a 43 year old female.    HPI:       Jyoti Fitch is a 43 year old female.   Patient's last menstrual period was 2024 (approximate).    Chief Complaint   Patient presents with    Annual     Patient here for annual exam, reports a lot of random spotting for the last few months.       Denies abnormal discharge or vaginal irritation.     Periods used to be like clockwork.     Now is still coming every month. Will have a flow every month but in between will have spotting.        Hx Prior Abnormal Pap: No  Pap Date: 19  Pap Result Notes: NEGATIVE    Menarche: 12 y/o   Period Cycle (Days): nl with sporatic spotting  Period Duration (Days): 7 days  Period Flow: varies - spotting to heavy with clots.   Use of Birth Control (if yes, specify type): Condoms  Hx Prior Abnormal Pap: No  Pap Date: 19  Pap Result Notes: NEGATIVE      Current partner x 20+ years. Monogamous relationship.   Feels safe. Denies abuse  Denies concerns for STI's.     Denies abnormal discharge or irritation    Last pap: 2019, NIL. Hx of abnormal pap NO        Family hx of breast , endometrial or ovarian CA: No    Has a mammogram scheduled.    Smokes 1/2 PPD. Has quit before. Has been on wellbutrin in past.     Has been on medication for anxiety, able to come off a few years ago. Some moodiness lately. Does not want to start back on pharmaceuticals.     Supplements: Fish Oil, l-theanine, rhodeala, black seed oil, Vit d & K, lions william, magnesium, probiotic, methylofolate.      Has 1 child, .     This is gyne only visit. Referred back to PCP for any non gyne related concerns.       PHQ-2 SCORE: 0  , done 2024          Depression Screening (PHQ-2/PHQ-9): Over the LAST 2 WEEKS   Little interest or pleasure in doing things: Not at all    Feeling down, depressed, or hopeless: Not at all    PHQ-2 SCORE: 0           HISTORY:  Past Medical History:    Anxiety      Past Surgical History:    Procedure Laterality Date    Insert intrauterine device      Remove intrauterine device        History reviewed. No pertinent family history.   Social History:   Social History     Socioeconomic History    Marital status:    Tobacco Use    Smoking status: Every Day     Current packs/day: 0.50     Average packs/day: 0.5 packs/day for 25.0 years (12.5 ttl pk-yrs)     Types: Cigarettes     Passive exposure: Current    Smokeless tobacco: Never    Tobacco comments:     7 daily    Vaping Use    Vaping status: Never Used   Substance and Sexual Activity    Alcohol use: Yes     Alcohol/week: 1.0 standard drink of alcohol     Types: 1 Glasses of wine per week     Comment: Socially     Drug use: Yes     Frequency: 7.0 times per week     Types: Cannabis     Comment: daily    Sexual activity: Yes     Partners: Male     Birth control/protection: Condom   Other Topics Concern    Breast feeding No    Reaction to local anesthetic No    Pt has a pacemaker No    Pt has a defibrillator No        OB History    Para Term  AB Living   2 1 1 0 1 1   SAB IAB Ectopic Multiple Live Births   1 0 0 0 1       Medications (Active prior to today's visit):  Current Outpatient Medications   Medication Sig Dispense Refill    NUTRITIONAL SUPPLEMENTS OR Take by mouth.         Allergies:  No Known Allergies      ROS:     Review of Systems   Constitutional: Negative.    Gastrointestinal: Negative.    Genitourinary:  Positive for menstrual problem. Negative for decreased urine volume, difficulty urinating, dyspareunia, dysuria, enuresis, flank pain, frequency, genital sores, hematuria, pelvic pain, urgency, vaginal bleeding, vaginal discharge and vaginal pain.   Neurological: Negative.          PHYSICAL EXAM:     Vitals:    24 0838   BP: 124/86   Pulse: 80     Physical Exam  Constitutional:       General: She is not in acute distress.     Appearance: Normal appearance. She is not ill-appearing.   Pulmonary:      Effort:  Pulmonary effort is normal.   Chest:   Breasts:     Right: No swelling, bleeding, inverted nipple, mass, nipple discharge, skin change or tenderness.      Left: No swelling, bleeding, inverted nipple, mass, nipple discharge, skin change or tenderness.   Genitourinary:     General: Normal vulva.      Labia:         Right: No rash, tenderness, lesion or injury.         Left: No rash, tenderness, lesion or injury.       Vagina: No signs of injury and foreign body. Bleeding (spotting, bright red) present. No vaginal discharge, erythema, tenderness, lesions or prolapsed vaginal walls.      Cervix: Cervical bleeding present. No cervical motion tenderness, discharge, friability, lesion, erythema or eversion.            Comments: Cervical polyp  Neurological:      Mental Status: She is alert and oriented to person, place, and time.   Psychiatric:         Mood and Affect: Mood normal.         Behavior: Behavior normal.         Thought Content: Thought content normal.          ASSESSMENT/PLAN:      Diagnoses and all orders for this visit:    Women's annual routine gynecological examination    Papanicolaou smear for cervical cancer screening    Intermenstrual spotting        Normal gyne exam. Pap with HPV to lab, pap guidelines reviewed  STD testing: Declines/  Discussed breast awareness. Has mammogram ordered.       Pt counseled on possible etiologies of heavy periods and/or irregular bleeding including uterine fibroids, uterine or cervical polyp, DUB/anovulatory bleeding and other benign and less common malignant etiologies.    Cervical polyp noted today. Discussed removal, desires. Possible cause of her bleeding. Removed. Advised if intermenstrual spotting continues should have further eval with endometrial bx &/or pelvic ultrasound.       F/u 1 year or prn              5/14/2024  Shelby Sanchez CNM

## 2024-05-16 ENCOUNTER — NURSE ONLY (OUTPATIENT)
Facility: CLINIC | Age: 44
End: 2024-05-16

## 2024-05-16 DIAGNOSIS — Z12.11 COLON CANCER SCREENING: Primary | ICD-10-CM

## 2024-05-16 NOTE — PROGRESS NOTES
Kate -   Please see special comments/notes. I did let the patient know we will let her know if she can direct schedule or after you review, if it would be best to come in for an office visit to discuss. She verbalized understanding.        Called patient for scheduled telephone colon screening.   Medications, pharmacy, and allergies verified with the patient.   **Please advise on colonoscopy and bowel prep orders**.     Age 45-66 y/o (Y/N): Yes  66-74 y/o ? Route to GI provider per rotation schedule   › GI MD preference: None  › Insurance:  BCBS PPO  › Last PCP Visit: 12/27/2023 -- last annual visit 7/28/2023  IF NO PCP within [facility] system ? Not TCS/FIT Eligible   › Last CBC drawn: 7/28/2023  › Date of positive FIT test: N/A  › H/W/BMI: 5'6 / 221 lb / 35.75 BMI    Special comments/notes: Patient expressed she has had heartburn/acid reflux for some time. She also will not have a BM every day but stated that's not necessarily out of the norm for her. Denies rectal bleeding, blood in stool, black stools, abdominal pain.    Telephone colon screening Questionnaires:  Yes No   Are you currently experiencing any new GI symptoms? [] [x]   If yes, symptom details: Not new - see above notes     Rectal Bleeding with or without bowel movements: [] [x]   Black stool: [] [x]   Dysphagia &Food feeling/getting stuck: [] [x]   Intractable Vomiting: [] [x]   Unexplained weight loss: [] [x]   First colonoscopy? [x] []   Family history of colon cancer? [] [x]   Any issues with anesthesia? [] [x]   If yes, explain:      Personal history of Resp. Issues/Oxygen Use/CRISTIN/COPD? [] [x]   If yes, CPAP/BiPAP? [] [x]   History of devices Pacemaker/Defibrillator/Stents? [] [x]   History of Cardiac/CVA issues/(MI/Stroke):  [] [x]     Medication usage:  Yes  No   Anticoagulants:  Anticoagulant (Except Aspirin) ? Route to RN staff to obtain ordering provider orders [] [x]   Diabetic Meds:   PO DM Meds ? hold day prior and day of procedure  Insulin ?  Route to RN clinical staff to obtain provider orders  [] [x]   Weight loss meds (Phentermine/Vyvanse/Saxsenda/etc): [] [x]   Iron/Herbal/Multivitamin Supplement (RX/OTC): supplements [x] []   Usage of marijuana, CBD &/or vape products: Marijuana [x] []

## 2024-05-20 LAB
.: NORMAL
.: NORMAL

## 2024-05-20 NOTE — PROGRESS NOTES
Contacted and spoke with patient.     Made appointment with Kate Eid PA-C on 6/10/2024 at 11:30 am at [facility].

## 2024-05-21 ENCOUNTER — TELEPHONE (OUTPATIENT)
Dept: OBGYN CLINIC | Facility: CLINIC | Age: 44
End: 2024-05-21

## 2024-05-21 NOTE — TELEPHONE ENCOUNTER
Pc to patient, notified pap with insufficient cells, may have been d/t bleeding obscuring. Discussed need to repeat. Reports has been bleeding since polyp removed. Uncertain if r/t polyp removal or is her period. Has been lighter than period. Advised to monitor for about a week and plan to RTC when not bleeding for pap. Agrees with plan. If bleeding persists over 2 wks would need evaluation.

## 2024-06-09 NOTE — PROGRESS NOTES
[facility]  Midwifery Focused Gynecology Problem Exam    Jyoti Fitch is a 43 year old female presenting for Gyn Exam (Repeat pap)  .    HPI:     Chief Complaint   Patient presents with    Gyn Exam     Repeat pap       Here for repeat pap as pap was unsatisfactory for evaluation. No complaints today    PHQ-2 SCORE: 0  , done 2024          Depression Screening (PHQ-2/PHQ-9): Over the LAST 2 WEEKS                         Medications (Active prior to today's visit):  Current Outpatient Medications   Medication Sig Dispense Refill    NUTRITIONAL SUPPLEMENTS OR Take by mouth.      Na Sulfate-K Sulfate-Mg Sulf (SUPREP BOWEL PREP KIT) 17.5-3.13-1.6 GM/177ML Oral Solution Take prep as directed by gastro office. May substitute with Trilyte/generic equivalent if needed. (Patient not taking: Reported on 2024) 1 each 0     Allergies:  No Known Allergies  HISTORY:   Menstrual History:  OB History    Para Term  AB Living   2 1 1 0 1 1   SAB IAB Ectopic Multiple Live Births   1 0 0 0 1      Patient's last menstrual period was 2024 (approximate).    Menarche: 12 y/o   Period Cycle (Days): nl with sporatic spotting  Period Duration (Days): 7 days  Period Flow: varies - spotting to heavy with clots.   Use of Birth Control (if yes, specify type): Condoms  Hx Prior Abnormal Pap: No  Pap Date: 19  Pap Result Notes: NEGATIVE    Past Medical History:    Anxiety     Past Surgical History:   Procedure Laterality Date    Insert intrauterine device      Remove intrauterine device       History reviewed. No pertinent family history.  Social History     Socioeconomic History    Marital status:      Spouse name: Not on file    Number of children: Not on file    Years of education: Not on file    Highest education level: Not on file   Occupational History    Not on file   Tobacco Use    Smoking status: Every Day     Current packs/day: 0.50     Average packs/day: 0.5 packs/day for 25.0 years  (12.5 ttl pk-yrs)     Types: Cigarettes     Passive exposure: Current    Smokeless tobacco: Never    Tobacco comments:     7 daily    Vaping Use    Vaping status: Never Used   Substance and Sexual Activity    Alcohol use: Yes     Alcohol/week: 1.0 standard drink of alcohol     Types: 1 Glasses of wine per week     Comment: Socially     Drug use: Yes     Frequency: 7.0 times per week     Types: Cannabis     Comment: daily    Sexual activity: Yes     Partners: Male     Birth control/protection: Condom   Other Topics Concern    Caffeine Concern Not Asked    Exercise Not Asked    Seat Belt Not Asked    Special Diet Not Asked    Stress Concern Not Asked    Weight Concern Not Asked    Grew up on a farm Not Asked    History of tanning Not Asked    Outdoor occupation Not Asked    Breast feeding No    Reaction to local anesthetic No    Pt has a pacemaker No    Pt has a defibrillator No   Social History Narrative    Not on file     Social Determinants of Health     Financial Resource Strain: Not on file   Food Insecurity: Not on file   Transportation Needs: Not on file   Physical Activity: Not on file   Stress: Not on file   Social Connections: Not on file   Housing Stability: Not on file       ROS:   Review of Systems   Constitutional: Negative.    Genitourinary: Negative.         PHYSICAL EXAM:   /87 (BP Location: Right arm, Patient Position: Sitting)   Pulse 78   Ht 5' 6\" (1.676 m)   Wt 221 lb 9.6 oz (100.5 kg)   LMP 05/25/2024 (Approximate)   BMI 35.77 kg/m²   Physical Exam  Constitutional:       Appearance: Normal appearance.   Genitourinary:     General: Normal vulva.      Labia:         Right: No rash, tenderness, lesion or injury.         Left: No rash, tenderness, lesion or injury.       Vagina: Normal.      Cervix: Normal.   Neurological:      Mental Status: She is alert.           ASSESSMENT:    Diagnoses and all orders for this visit:    Encounter for repeat Papanicolaou smear of cervix due to previous  unsatisfactory results  -     ThinPrep PAP Smear; Future  -     Hpv Dna  High Risk , Thin Prep Collect; Future      Pap collected and sent to lab.     Shelby Sanchez CNM  6/9/2024  4:47 PM

## 2024-06-10 ENCOUNTER — OFFICE VISIT (OUTPATIENT)
Facility: CLINIC | Age: 44
End: 2024-06-10
Payer: COMMERCIAL

## 2024-06-10 ENCOUNTER — TELEPHONE (OUTPATIENT)
Facility: CLINIC | Age: 44
End: 2024-06-10

## 2024-06-10 VITALS
WEIGHT: 221 LBS | HEIGHT: 66 IN | SYSTOLIC BLOOD PRESSURE: 126 MMHG | DIASTOLIC BLOOD PRESSURE: 86 MMHG | HEART RATE: 80 BPM | BODY MASS INDEX: 35.52 KG/M2

## 2024-06-10 DIAGNOSIS — Z12.11 COLON CANCER SCREENING: Primary | ICD-10-CM

## 2024-06-10 DIAGNOSIS — K21.9 GASTROESOPHAGEAL REFLUX DISEASE, UNSPECIFIED WHETHER ESOPHAGITIS PRESENT: ICD-10-CM

## 2024-06-10 DIAGNOSIS — K59.00 CONSTIPATION, UNSPECIFIED CONSTIPATION TYPE: ICD-10-CM

## 2024-06-10 DIAGNOSIS — K21.9 GASTROESOPHAGEAL REFLUX DISEASE WITHOUT ESOPHAGITIS: ICD-10-CM

## 2024-06-10 RX ORDER — SODIUM, POTASSIUM,MAG SULFATES 17.5-3.13G
SOLUTION, RECONSTITUTED, ORAL ORAL
Qty: 1 EACH | Refills: 0 | Status: SHIPPED | OUTPATIENT
Start: 2024-06-10

## 2024-06-10 NOTE — TELEPHONE ENCOUNTER
Scheduled for:  Colonoscopy [id number] EGD [id number]  Provider Name:  Dr. Shaw   Date:  07/08/2024  Location:[facility]  Sedation:  [facility]  Time:  1:15pm (pt is aware that [facility] will call the day before to confirm arrival time)  Prep:  Trilyte Prep Instructions Given At The Office Visit.    Meds/Allergies Reconciled?:  Kate Eid PA-C Reviewed  Diagnosis with codes:  Colon Screening Z12.11/ GERD K21.9  Was patient informed to call insurance with codes (Y/N):  Yes  Referral sent?:  Referral was sent at the time of electronic surgical scheduling.  EM or [facility] notified?:  I sent an electronic request to Endo Scheduling and received a confirmation today.  Medication Orders:  Pt is aware to NOT take iron pills, herbal meds and diet supplements for 7 days before exam. Also to NOT take any form of alcohol, recreational drugs and any forms of ED meds 24 hours before exam.   Misc Orders:       Further instructions given by staff:  I provide prep instructions to patient at the time of the appointment and reviewed date, time and location, she verbalized that she understood and is aware to call if she has any questions.    Patient was informed about the new cancellation policy for his/her procedure. Patient was also given a copy of the cancellation policy at the time of the appointment and verbalized understanding.

## 2024-06-10 NOTE — H&P
[facility] - Gastroenterology                                                                                                               Reason for consult:   Chief Complaint   Patient presents with    Consult       Requesting physician or provider: Smith Navarro MD      HPI:   Jyoti Fitch is a 43 year old year-old female with history of anxiety, DUB hypercholesterolemia who presents for CRC screening.     GERD- Over last 10 or so years. She knows what triggers her symptoms. She takes a pepcid before bed, over last year. Can have episodes of more severe nausea. Often has night time symptoms. Previous was taking tums. Not a late night eater. she denies dysphagia, odynophagia and/or globus. she denies abdominal pain. Good appetite.Weight has stable. Has tried omeprazole in the past.     CRC- Bowel habits fluctuate. She notes most of the time has a BM daily. She does try to have probiotic daily. Diet can lack fiber. Drinking close to 64 oz of water daily. she denies straining and/or incomplete evacuation.  she denies brbpr and/or melena.    Last CBC 7/2023 demonstrated hemoglobin of 11.8. Normal LFTs on CMP.     NSAIDS: rare  Tobacco: half a pack a day   Alcohol: rare  Marijuana: daily use  Illicit drugs: none    FH GI malignancy- none  FH celiac- none  FH liver dz- none  FH IBD- none    No history of adverse reaction to sedation  No CRISTIN  No anticoagulants  No pacemaker/defibrillator  No pain medications and/or sleep aides      Last colonoscopy: none  Last EGD: none    Wt Readings from Last 6 Encounters:   06/10/24 221 lb (100.2 kg)   05/14/24 221 lb 6.4 oz (100.4 kg)   12/27/23 219 lb (99.3 kg)   07/28/23 218 lb (98.9 kg)   10/25/22 223 lb (101.2 kg)   09/20/22 221 lb (100.2 kg)        History, Medications, Allergies, ROS:      Past Medical History:    Anxiety      Past Surgical History:   Procedure Laterality Date    Insert intrauterine device      Remove  intrauterine device        Family Hx: History reviewed. No pertinent family history.   Social History:   Social History     Socioeconomic History    Marital status:    Tobacco Use    Smoking status: Every Day     Current packs/day: 0.50     Average packs/day: 0.5 packs/day for 25.0 years (12.5 ttl pk-yrs)     Types: Cigarettes     Passive exposure: Current    Smokeless tobacco: Never    Tobacco comments:     7 daily    Vaping Use    Vaping status: Never Used   Substance and Sexual Activity    Alcohol use: Yes     Alcohol/week: 1.0 standard drink of alcohol     Types: 1 Glasses of wine per week     Comment: Socially     Drug use: Yes     Frequency: 7.0 times per week     Types: Cannabis     Comment: daily    Sexual activity: Yes     Partners: Male     Birth control/protection: Condom   Other Topics Concern    Breast feeding No    Reaction to local anesthetic No    Pt has a pacemaker No    Pt has a defibrillator No        Medications (Active prior to today's visit):  Current Outpatient Medications   Medication Sig Dispense Refill    Na Sulfate-K Sulfate-Mg Sulf (SUPREP BOWEL PREP KIT) 17.5-3.13-1.6 GM/177ML Oral Solution Take prep as directed by gastro office. May substitute with Trilyte/generic equivalent if needed. 1 each 0    NUTRITIONAL SUPPLEMENTS OR Take by mouth.         Allergies:  No Known Allergies    ROS:   CONSTITUTIONAL: negative for fevers, chills, sweats and weight loss  EYES Negative for red eyes, yellow eyes, changes in vision  HEENT: Negative for dysphagia and hoarseness  RESPIRATORY: Negative for cough and shortness of breath  CARDIOVASCULAR: Negative for chest pain, palpitations  GASTROINTESTINAL: See HPI  GENITOURINARY: Negative for dysuria and frequency  MUSCULOSKELETAL: Negative for arthralgias and myalgias  NEUROLOGICAL: Negative for dizziness and headaches  BEHAVIOR/PSYCH: Negative for anxiety and poor appetite    PHYSICAL EXAM:   Blood pressure 126/86, pulse 80, height 5' 6\" (1.676 m),  weight 221 lb (100.2 kg), last menstrual period 04/25/2024, not currently breastfeeding.    GEN: WD/WN, NAD  HEENT: Supple symmetrical, trachea midline  CV: RRR, the extremities are warm and well perfused   LUNGS: No increased work of breathing  ABDOMEN: No scars, normal bowel sounds, soft, non-tender, non-distended no rebound or guarding, no masses, no hepatomegaly  MSK: No redness, no warmth, no swelling of joints  SKIN: No jaundice, no erythema, no rashes  HEMATOLOGIC: No bleeding, no bruising  NEURO: Alert and interactive, normal gait    Labs/Imaging/Procedures:     Patient's pertinent labs and imaging were reviewed and discussed with patient today.     Lab Results   Component Value Date    WBC 7.5 07/28/2023    RBC 4.54 07/28/2023    HGB 11.8 (L) 07/28/2023    HCT 36.6 07/28/2023    MCV 80.6 07/28/2023    MCH 26.0 07/28/2023    MCHC 32.2 07/28/2023    RDW 14.8 07/28/2023    .0 07/28/2023        Lab Results   Component Value Date    GLU 90 07/28/2023    BUN 20 (H) 07/28/2023    BUNCREA 19.2 07/28/2023    CREATSERUM 1.04 (H) 07/28/2023    ANIONGAP 4 07/28/2023    GFRNAA 67 07/28/2022    GFRAA 77 07/28/2022    CA 9.5 07/28/2023    OSMOCALC 288 07/28/2023    ALKPHO 61 07/28/2023    AST 15 07/28/2023    ALT 17 07/28/2023    BILT 0.5 07/28/2023    TP 7.3 07/28/2023    ALB 3.6 07/28/2023    GLOBULIN 3.7 07/28/2023    AGRATIO 1.9 07/26/2019     07/28/2023    K 4.1 07/28/2023     07/28/2023    CO2 28.0 07/28/2023        No results found.          .  ASSESSMENT/PLAN:   Jyoti Fitch is a 43 year old year-old female with history of anxiety, DUB hypercholesterolemia who presents for CRC screening.     #crc screening   #irregular BM  -patient due for screening cln  -no family hx of colon cancer, no weight loss or decreased appetite, mild anemia on most recent labs  -no brbpr or melena  -can have episodes of constipation, reviewed lifestyle changes, will plan for colonoscopy at this  time    #GERD  -based on diet and can have night time symptoms  -take pepcid in evening to prevent GERD  -over last 10 years  -discussed with chronic symptoms reasonable to under go EGD at this time to evaluate for Kmi's vs esphagitis  -discussed lifestyle changes to assist with GERD  -patient agreeable to plan, all questions answered      Recommendations:  GERD  -can continue pepcid, can discuss   -May take Tums or other over the counter antacid to relieve intermittent heartburn  -Chew foods carefully prior to swallowing  -Increase water intake to 8 (8oz) glasses per day  -Decrease food triggers (Spicy foods, milk products near end of day, chocolate and fatty foods)  -Avoid aspirin, NSAIDs (i.e. Ibuprofen, motrin, Advil, Aleeve, naproxen), caffeine, alcohol, tobacco  -Avoid eating meals 3 hours before bed time  -Avoid wearing tight fitting clothes  -Elevate head of bed to 30 degrees with blocks under legs at head of bed     Constipation  -Increase water intake to 64oz of water per day  -Increase fiber to 20-30 g per day with fruits, vegetables and whole grains (benefiber, fiber con, citral)  -Increase exercise to 150 mins/week  -Do not ignore urge to defecate  -Avoid artifical sugars  -Follow- up in 6-8 weeks  -If bowel habits change or constipation worsens, call our office sooner       1. Schedule colonoscopy/EGD with next available provider with [facility] [Diagnosis: crc screening, GERD]    2.  bowel prep from pharmacy (split suprep)    3. Continue all medications as normal for your procedure.    4. Read all bowel prep instructions carefully. Bowel prep instructions can also be found online at:  [email or web address]     5. AVOID seeds, nuts, popcorn, raw fruits and vegetables for 3 days before procedure    6. You MAY need to go for COVID testing 72 hours before procedure. The testing team will call you a few days before your procedure to discuss with you if testing is required. If you are asked to go for  COVID testing and do not completed the test, the procedure cannot be performed.     7. If you start any NEW medication after your visit today, please notify us. Certain medications (like iron or weight loss medications) will need to be held before the procedure, or the procedure cannot be performed safely.        Orders This Visit:  No orders of the defined types were placed in this encounter.      Meds This Visit:  Requested Prescriptions     Signed Prescriptions Disp Refills    Na Sulfate-K Sulfate-Mg Sulf (SUPREP BOWEL PREP KIT) 17.5-3.13-1.6 GM/177ML Oral Solution 1 each 0     Sig: Take prep as directed by gastro office. May substitute with Trilyte/generic equivalent if needed.       Imaging & Referrals:  None    ENDOSCOPIC RISK BENEFIT DISCUSSION: I described the procedure in great detail with the patient. I discussed the risks and benefits, including but not limited to: bleeding, perforation, infection, anesthesia complications, and even death. Patient will be NPO after midnight and will have a person physically present at time of pick-up to drive patient home. Patient verbalized understanding and agrees to proceed with procedure as planned.      Kate Eid PA-C   6/10/2024        This note was partially prepared using Dragon [employer] voice recognition dictation software. As a result, errors may occur. When identified, these errors have been corrected. While every attempt is made to correct errors during dictation, discrepancies may still exist.

## 2024-06-10 NOTE — PATIENT INSTRUCTIONS
Recommendations:  GERD  -can continue pepcid, can discuss   -May take Tums or other over the counter antacid to relieve intermittent heartburn  -Chew foods carefully prior to swallowing  -Increase water intake to 8 (8oz) glasses per day  -Decrease food triggers (Spicy foods, milk products near end of day, chocolate and fatty foods)  -Avoid aspirin, NSAIDs (i.e. Ibuprofen, motrin, Advil, Aleeve, naproxen), caffeine, alcohol, tobacco  -Avoid eating meals 3 hours before bed time  -Avoid wearing tight fitting clothes  -Elevate head of bed to 30 degrees with blocks under legs at head of bed     Constipation  -Increase water intake to 64oz of water per day  -Increase fiber to 20-30 g per day with fruits, vegetables and whole grains (benefiber, fiber con, citral)  -Increase exercise to 150 mins/week  -Do not ignore urge to defecate  -Avoid artifical sugars  -Follow- up in 6-8 weeks  -If bowel habits change or constipation worsens, call our office sooner       1. Schedule colonoscopy/EGD with next available provider with [facility] [Diagnosis: crc screening, GERD]    2.  bowel prep from pharmacy (split suprep)    3. Continue all medications as normal for your procedure.    4. Read all bowel prep instructions carefully. Bowel prep instructions can also be found online at:  [email or web address]     5. AVOID seeds, nuts, popcorn, raw fruits and vegetables for 3 days before procedure    6. You MAY need to go for COVID testing 72 hours before procedure. The testing team will call you a few days before your procedure to discuss with you if testing is required. If you are asked to go for COVID testing and do not completed the test, the procedure cannot be performed.     7. If you start any NEW medication after your visit today, please notify us. Certain medications (like iron or weight loss medications) will need to be held before the procedure, or the procedure cannot be performed safely.

## 2024-06-11 ENCOUNTER — OFFICE VISIT (OUTPATIENT)
Dept: OBGYN CLINIC | Facility: CLINIC | Age: 44
End: 2024-06-11
Payer: COMMERCIAL

## 2024-06-11 VITALS
SYSTOLIC BLOOD PRESSURE: 138 MMHG | DIASTOLIC BLOOD PRESSURE: 87 MMHG | BODY MASS INDEX: 35.62 KG/M2 | WEIGHT: 221.63 LBS | HEIGHT: 66 IN | HEART RATE: 78 BPM

## 2024-06-11 DIAGNOSIS — R87.615 ENCOUNTER FOR REPEAT PAPANICOLAOU SMEAR OF CERVIX DUE TO PREVIOUS UNSATISFACTORY RESULTS: Primary | ICD-10-CM

## 2024-06-11 PROCEDURE — 3079F DIAST BP 80-89 MM HG: CPT | Performed by: ADVANCED PRACTICE MIDWIFE

## 2024-06-11 PROCEDURE — 3008F BODY MASS INDEX DOCD: CPT | Performed by: ADVANCED PRACTICE MIDWIFE

## 2024-06-11 PROCEDURE — 3075F SYST BP GE 130 - 139MM HG: CPT | Performed by: ADVANCED PRACTICE MIDWIFE

## 2024-06-12 LAB — HPV E6+E7 MRNA CVX QL NAA+PROBE: NEGATIVE

## 2024-07-01 ENCOUNTER — TELEPHONE (OUTPATIENT)
Facility: CLINIC | Age: 44
End: 2024-07-01

## 2024-07-01 DIAGNOSIS — Z12.11 COLON CANCER SCREENING: ICD-10-CM

## 2024-07-01 DIAGNOSIS — K21.9 GASTROESOPHAGEAL REFLUX DISEASE WITHOUT ESOPHAGITIS: Primary | ICD-10-CM

## 2024-07-01 NOTE — TELEPHONE ENCOUNTER
GI     The patient only wants to proceed with colonoscopy on 7/8/2024    Please cancel EGD, she will be paying out of pocket.    Thanks!

## 2024-07-01 NOTE — TELEPHONE ENCOUNTER
See other TE 6/10/2024  Modified provider's epic schedule.     Scheduled for:  Colonoscopy 75553 cancelled EGD [id number]  Provider Name: Dr. Shaw   Date:  07/08/2024  Location: [facility]  Sedation: [facility]  Time:  1:15 pm (pt is aware that [facility] will call the day before to confirm arrival time)  Prep:  Trilyte Prep Instructions Given At The Office Visit.  Meds/Allergies Reconciled?:  Kate Eid PA-C Reviewed  Diagnosis with codes:  Colon Screening Z12.11/ GERD K21.9  Was patient informed to call insurance with codes (Y/N):  Yes  Referral sent?:  Referral was sent at the time of electronic surgical scheduling.  [facility] or [facility] notified?: Electronic case change request was sent to [facility] via [facility]/[employer].

## 2024-07-01 NOTE — TELEPHONE ENCOUNTER
Patient calling states was advised that Esophagogastroduodenoscopy would be out of pocket. Asking to speak to provider first on what is advised before canceling Esophagogastroduodenoscopy. Please call.

## 2024-07-01 NOTE — TELEPHONE ENCOUNTER
Kyle MATAMOROS    Called and spoke to the patient, date of birth and name verified.    She stated she was informed by [employer] benefits that EGD is not covered by her insurance, she has to pay deductible as well. She will be paying $700 out of pocket upfront.    Per office visit notes, EGD to evaluate for Kim's vs esphagitis     She wants to cancel EGD, she will proceed with colonoscopy.    Thank you

## 2024-07-08 PROBLEM — K63.5 POLYP OF COLON: Status: ACTIVE | Noted: 2024-07-08

## 2024-07-08 PROBLEM — K64.8 INTERNAL HEMORRHOIDS: Status: ACTIVE | Noted: 2024-07-08

## 2024-07-08 PROCEDURE — 88305 TISSUE EXAM BY PATHOLOGIST: CPT | Performed by: INTERNAL MEDICINE

## 2024-07-17 ENCOUNTER — HOSPITAL ENCOUNTER (OUTPATIENT)
Dept: MAMMOGRAPHY | Facility: HOSPITAL | Age: 44
Discharge: HOME | End: 2024-07-17
Attending: INTERNAL MEDICINE
Payer: COMMERCIAL

## 2024-07-17 DIAGNOSIS — Z00.00 ANNUAL PHYSICAL EXAM: ICD-10-CM

## 2024-07-17 PROCEDURE — 77063 BREAST TOMOSYNTHESIS BI: CPT | Performed by: INTERNAL MEDICINE

## 2024-07-17 PROCEDURE — 77067 SCR MAMMO BI INCL CAD: CPT | Performed by: INTERNAL MEDICINE

## 2024-07-23 ENCOUNTER — TELEPHONE (OUTPATIENT)
Facility: CLINIC | Age: 44
End: 2024-07-23

## 2024-07-23 NOTE — TELEPHONE ENCOUNTER
MINERVA Shaw MD  [facility] Clinical Staff  GI staff: please place recall for colonoscopy in 3 years  Colonoscopy done on 07/08/2024   Letter mailed out to patient on  7/23/2024  Health Maintenance Updated and Patient Outreach was placed for Colon Recall

## 2024-08-23 ENCOUNTER — OFFICE VISIT (OUTPATIENT)
Dept: INTERNAL MEDICINE CLINIC | Facility: CLINIC | Age: 44
End: 2024-08-23
Payer: COMMERCIAL

## 2024-08-23 VITALS
HEART RATE: 81 BPM | HEIGHT: 66 IN | SYSTOLIC BLOOD PRESSURE: 134 MMHG | BODY MASS INDEX: 35.32 KG/M2 | DIASTOLIC BLOOD PRESSURE: 86 MMHG | WEIGHT: 219.81 LBS | OXYGEN SATURATION: 100 %

## 2024-08-23 DIAGNOSIS — M79.601 RIGHT ARM PAIN: Primary | ICD-10-CM

## 2024-08-23 PROCEDURE — 99213 OFFICE O/P EST LOW 20 MIN: CPT

## 2024-08-23 PROCEDURE — 3079F DIAST BP 80-89 MM HG: CPT

## 2024-08-23 PROCEDURE — 3008F BODY MASS INDEX DOCD: CPT

## 2024-08-23 PROCEDURE — 3075F SYST BP GE 130 - 139MM HG: CPT

## 2024-08-23 NOTE — PROGRESS NOTES
Subjective:   Jyoti Fitch is a 44 year old female who presents for Arm Pain (Right arm pain for about a month)     C/c right arm pain which started at the upper arm radiating down her whole arm. It started while she was performing a lat pull-down at the gym and it felt harder to pull the machine down on the right side. Now the pain is mostly to her distal forearm and wrist. Does not have any pain right now but has limited ability to lift and carry things on that side; feels weaker and triggers pain. Denies numbness or tingling of the arm, sometimes maybe the hand falls asleep. This affect the entire hand when it occurs.  After the initial injury she would rest a few days and would feel better so would lift again, until a few weeks ago it was really bothering her while lifting so she has not lifted in two weeks. Has tried ice.   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 though notes she is ambidextrous    History/Other:    Chief Complaint Reviewed and Verified  Nursing Notes Reviewed and   Verified  Tobacco Reviewed  Allergies Reviewed  Medications Reviewed    Problem List Reviewed  Medical History Reviewed  Surgical History   Reviewed  Family History Reviewed         Tobacco:  Social History     Tobacco Use   Smoking Status Every Day    Current packs/day: 0.50    Average packs/day: 0.5 packs/day for 25.0 years (12.5 ttl pk-yrs)    Types: Cigarettes    Passive exposure: Current   Smokeless Tobacco Never   Tobacco Comments    7 daily      E-Cigarettes/Vaping       Questions Responses    E-Cigarette Use Never User           Current Outpatient Medications   Medication Sig Dispense Refill    NUTRITIONAL SUPPLEMENTS OR Take by mouth.         Review of Systems:  Review of Systems  10 point review of systems otherwise negative with the exception of HPI and assessment and plan    Objective:   /86 (BP Location: Left arm, Patient Position: Sitting, Cuff Size: large)    Pulse 81   Ht 5' 6\" (1.676 m)   Wt 219 lb 12.8 oz (99.7 kg)   LMP 07/08/2024 (Approximate)   SpO2 100%   BMI 35.48 kg/m²  Estimated body mass index is 35.48 kg/m² as calculated from the following:    Height as of this encounter: 5' 6\" (1.676 m).    Weight as of this encounter: 219 lb 12.8 oz (99.7 kg).  Physical Exam  Vitals reviewed.   Constitutional:       General: She is not in acute distress.     Appearance: Normal appearance. She is well-developed.   Cardiovascular:      Rate and Rhythm: Normal rate.   Pulmonary:      Effort: Pulmonary effort is normal.   Musculoskeletal:      Right shoulder: Normal.      Right upper arm: Normal. No swelling, edema, tenderness or bony tenderness.      Right elbow: Normal. No swelling or deformity. No tenderness.      Right forearm: Normal.      Right wrist: Normal.      Comments: Negative carpal compression test and Tinel sign   Normal strength 5/5 bilaterally to upper extremities    Skin:     General: Skin is warm and dry.   Neurological:      Mental Status: She is alert and oriented to person, place, and time.       Assessment & Plan:   1. Right arm pain (Primary)  -     Physiatry Referral - In Network  Ibuprofen 600mg BID with food until appointment   Can also try a wrist brace at night    ISACC Gracia, 8/23/2024, 12:30 PM

## 2024-09-04 ENCOUNTER — OFFICE VISIT (OUTPATIENT)
Dept: INTERNAL MEDICINE CLINIC | Facility: CLINIC | Age: 44
End: 2024-09-04
Payer: COMMERCIAL

## 2024-09-04 VITALS
HEART RATE: 80 BPM | BODY MASS INDEX: 35.03 KG/M2 | OXYGEN SATURATION: 99 % | HEIGHT: 66 IN | WEIGHT: 218 LBS | DIASTOLIC BLOOD PRESSURE: 82 MMHG | TEMPERATURE: 98 F | SYSTOLIC BLOOD PRESSURE: 126 MMHG | RESPIRATION RATE: 16 BRPM

## 2024-09-04 DIAGNOSIS — E78.00 MILD HYPERCHOLESTEROLEMIA: ICD-10-CM

## 2024-09-04 DIAGNOSIS — Z12.31 VISIT FOR SCREENING MAMMOGRAM: ICD-10-CM

## 2024-09-04 DIAGNOSIS — R92.8 ABNORMAL MAMMOGRAM: ICD-10-CM

## 2024-09-04 DIAGNOSIS — Z00.00 ANNUAL PHYSICAL EXAM: Primary | ICD-10-CM

## 2024-09-04 DIAGNOSIS — F17.210 CIGARETTE NICOTINE DEPENDENCE WITHOUT COMPLICATION: ICD-10-CM

## 2024-09-04 DIAGNOSIS — F41.9 ANXIETY: ICD-10-CM

## 2024-09-04 DIAGNOSIS — Z12.11 COLON CANCER SCREENING: ICD-10-CM

## 2024-09-04 DIAGNOSIS — E66.09 CLASS 2 OBESITY DUE TO EXCESS CALORIES WITHOUT SERIOUS COMORBIDITY WITH BODY MASS INDEX (BMI) OF 35.0 TO 35.9 IN ADULT: ICD-10-CM

## 2024-09-04 PROBLEM — E66.9 OBESITY (BMI 30-39.9): Status: RESOLVED | Noted: 2020-06-25 | Resolved: 2024-09-04

## 2024-09-04 PROBLEM — E66.812 CLASS 2 OBESITY DUE TO EXCESS CALORIES WITHOUT SERIOUS COMORBIDITY WITH BODY MASS INDEX (BMI) OF 35.0 TO 35.9 IN ADULT: Status: ACTIVE | Noted: 2024-09-04

## 2024-09-04 PROCEDURE — 3074F SYST BP LT 130 MM HG: CPT | Performed by: INTERNAL MEDICINE

## 2024-09-04 PROCEDURE — 99406 BEHAV CHNG SMOKING 3-10 MIN: CPT | Performed by: INTERNAL MEDICINE

## 2024-09-04 PROCEDURE — 3008F BODY MASS INDEX DOCD: CPT | Performed by: INTERNAL MEDICINE

## 2024-09-04 PROCEDURE — 3079F DIAST BP 80-89 MM HG: CPT | Performed by: INTERNAL MEDICINE

## 2024-09-04 PROCEDURE — 99396 PREV VISIT EST AGE 40-64: CPT | Performed by: INTERNAL MEDICINE

## 2024-09-04 NOTE — PATIENT INSTRUCTIONS
Prevention Guidelines, Women Ages 40 to 49  Screening tests and vaccines are an important part of managing your health. A screening test is done to find diseases in people who don't have any symptoms. The goal is to find a disease early so lifestyle changes and checkups can reduce the risk of disease. Or the goal may be to detect it early to treat it most effectively. Screening tests are not used to diagnose a disease. But they are used to see if more testing is needed. Health counseling is important, too. Below are guidelines for these, for women ages 40 to 49. Talk with your healthcare provider to make sure you’re up-to-date on what you need.  Screening Who needs it How often   Type 2 diabetes or prediabetes All women beginning at age 45 and women without symptoms at any age who are overweight or obese and have 1 or more additional risk factors for diabetes At least every 3 years1   Type 2 diabetes or prediabetes All women diagnosed with gestational diabetes Lifelong testing every 3 years   Type 2 diabetes All women with prediabetes Every year   Alcohol misuse All women in this age group At routine exams   Blood pressure All women in this age group Yearly checkup if your blood pressure is normal  Normal blood pressure is less than 120/80 mm Hg  If your blood pressure reading is higher than normal, follow the advice of your healthcare provider   Breast cancer All women at average risk in this age group Screening with a mammogram can start at age 40.2 Talk with your healthcare provider to help you decide when to start screening. At age 45 start yearly mammograms.3    Cervical cancer All women in this age group, except women who have had a complete hysterectomy Pap test every 3 years or Pap test plus human papilloma virus (HPV) test every 5 years   Colorectal cancer Women age 45 years and older at average risk  Multiple tests are available and are used at different times. Possible tests include:  Flexible  sigmoidoscopy every 5 years, or  Colonoscopy every 10 years, or  CT colonography (virtual colonoscopy) every 5 years, or  Yearly fecal occult blood test, or  Yearly fecal immunochemical test every year, or  Stool DNA test, every 3 years  If you choose a test other than a colonoscopy and have an abnormal test result, you will need to follow-up with a colonoscopy. Screening advice varies among expert groups. Talk with your healthcare provider about which tests are best for you.  Some people should be screened using a different schedule because of their personal or family health history. Talk with your healthcare provider about your health history.   Chlamydia Women at increased risk for infection At routine exams if you're at risk or have symptoms   Depression All women in this age group At routine exams   Gonorrhea Sexually active women at increased risk for infection At routine exams   Hepatitis C Anyone at increased risk; 1 time for those born between 1945 and 1965 At routine exams   High cholesterol or triglycerides All women ages 45 and older who are at risk for coronary artery disease; younger women, talk with your healthcare provider At least every 5 years   HIV All women At routine exams. Those with risk factors for HIV should be tested at least annually.   Obesity All women in this age group At routine exams   Syphilis Women at increased risk for infection-talk with your healthcare provider At routine exams   Tuberculosis Women at increased risk for infection-talk with your healthcare provider Ask your healthcare provider   Vision All women in this age group Complete exam at age 40 and eye exams every 2 to 4 years. If you have a chronic disease, ask your healthcare provider how often you should have your eyes examined.4   Vaccine Who needs it How often   Chickenpox (varicella) All women in this age group who have no record of this infection or vaccine 2 doses; the second dose should be given at least 4 weeks  after the first dose   Hepatitis A Women at increased risk for infection-talk with your healthcare provider 2 doses given 6 months apart   Hepatitis B Women at increased risk for infection-talk with your healthcare provider 3 doses over 6 months; second dose should be given 1 month after the first dose; the third dose should be given at least 2 months after the second dose and at least 4 months after the first dose   Haemophilus influenzae Type B (HIB) Women at increased risk 1 to 3 doses   Influenza (flu) All women in this age group Once a year   Measles, mumps, rubella (MMR) All women in this age group who have no record of these infections or vaccines 1 or 2 doses   Meningococcal Women at increased risk for infection-talk with your healthcare provider 1 or more doses   Pneumococcal conjugate vaccine (PCV13) and pneumococcal polysaccharide vaccine (PPSV23) Women at increased risk for infection-talk with your healthcare provider 1 or 2 doses   Tetanus/diphtheria/pertussis (Td/Tdap) booster All women in this age group A 1-time dose of Tdap instead of a Td booster after age 18, then Td every 10 years   Counseling Who needs it How often   BRCA gene mutation testing for breast and ovarian cancer susceptibility Women with increased risk for having gene mutation When your risk is known   Breast cancer and chemoprevention Women at high risk for breast cancer When your risk is known   Diet and exercise Women who are overweight or obese When diagnosed, and then at routine exams   Domestic violence Women at the age in which they are able to have children At routine exams   Sexually transmitted infection prevention Women at increased risk for infection-talk with your healthcare provider At routine exams   Use of tobacco and the health effects it can cause All women in this age group Every exam   1 American Diabetes Association  2 American College of Obstetricians and Gynecologists   3 American Cancer Society  4 American  Academy of Ophthalmology  Date Last Reviewed: 11/1/2017  © 5257-3161 [employer]. [address] [place] [address], [place] [address]. All rights reserved. This information is not intended as a substitute for professional medical care. Always follow your healthcare professional's instructions.        Quitting Smoking    Quitting smoking is the most important step you can take to improve your health. We're glad you have set a goal to improve your health.    Quit Smoking Resources    In addition to medications, use the STAR plan to help you successfully quit.   Stick with your quit date!   Tell friends, family, and coworkers your quit date. Request their understanding and support.  Anticipate and prepare for challenges. Some examples are withdrawal symptoms, being around others who smoke, and drinking alcohol.  Remove all tobacco products and paraphernalia from your environment. Make your home and vehicles smoke-free.    Free resources for additional support:  National tobacco quitline: 1-800-QUIT-NOW (1-387.273.1020).  SmokefreeTXT is a free text program to assist you in quitting. Visit https://www.smokefree.gov/smokefreetxt for more information.  Feel free to call your care manager at ([phone]) for additional support.

## 2024-09-04 NOTE — PROGRESS NOTES
Patient ID: Jyoti Fitch is a 44 year old female.  Chief Complaint   Patient presents with    Physical        HISTORY OF PRESENT ILLNESS:   HPI  Patient presents for above.  Here for annual physical and to discuss other medical issues.     She has a longstanding history of anxiety on various medications.  She longer takes any medications.  She had been on Lexapro, Wellbutrin, Pristiq, and Cymbalta as well in the past.  She moved from [place] to the area in 2019.  Thinks her anxiety is more provoked by work issues.  Does not want to see a therapist or psychiatrist.     History of abnormal mammogram.  States she had her first mammogram when in [place] and apparently there was some abnormality in her left breast that she is not aware of.  She did have an ultrasound done and it was recommended she get a diagnostic mammogram 6 months later.  Most recent mammogram done in July 2024 was normal and recommended screening mammogram 1 year later.     History of mild hypercholesterolemia.  Not on any medications.  Exercises 6 times weekly.  Admits diet could be better.    History of obesity with a BMI of 35.  As above, she exercises regularly but eats poorly.    She smokes 1/2 pack/day.  She is done this for 20 years.  There is no family history for premature cardiac disease or cancers.  Had colonoscopy in 2024 with repeat to be done in 2027.    Review of Systems  Ten point review of systems otherwise negative with the exception of HPI and assessment and plan.    MEDICAL HISTORY:     Past Medical History:    Anxiety    Colon polyps    repeat CLN in 2027    Esophageal reflux       Past Surgical History:   Procedure Laterality Date    Colonoscopy N/A 7/8/2024    Procedure: COLONOSCOPY;  Surgeon: MINERVA Shaw MD;  Location: [facility] MAIN OR    Insert intrauterine device      Remove intrauterine device           Current Outpatient Medications:     NUTRITIONAL SUPPLEMENTS OR, Take by mouth., Disp: , Rfl:     Allergies:No  Known Allergies    Social History     Socioeconomic History    Marital status:      Spouse name: Not on file    Number of children: Not on file    Years of education: Not on file    Highest education level: Not on file   Occupational History    Not on file   Tobacco Use    Smoking status: Every Day     Current packs/day: 0.50     Average packs/day: 0.5 packs/day for 25.0 years (12.5 ttl pk-yrs)     Types: Cigarettes     Passive exposure: Current    Smokeless tobacco: Never    Tobacco comments:     7 daily    Vaping Use    Vaping status: Never Used   Substance and Sexual Activity    Alcohol use: Yes     Alcohol/week: 1.0 standard drink of alcohol     Types: 1 Glasses of wine per week     Comment: Socially     Drug use: Yes     Frequency: 7.0 times per week     Types: Cannabis     Comment: daily    Sexual activity: Yes     Partners: Male     Birth control/protection: Condom   Other Topics Concern    Caffeine Concern Not Asked    Exercise Not Asked    Seat Belt Not Asked    Special Diet Not Asked    Stress Concern Not Asked    Weight Concern Not Asked    Grew up on a farm Not Asked    History of tanning Not Asked    Outdoor occupation Not Asked    Breast feeding No    Reaction to local anesthetic No    Pt has a pacemaker No    Pt has a defibrillator No   Social History Narrative    Not on file     Social Determinants of Health     Financial Resource Strain: Not on file   Food Insecurity: Not on file   Transportation Needs: Not on file   Physical Activity: Not on file   Stress: Not on file   Social Connections: Not on file   Housing Stability: Not on file           PHYSICAL EXAM:     Vitals:    09/04/24 0901   BP: 126/82   Pulse: 80   Resp: 16   Temp: 98 °F (36.7 °C)   TempSrc: Temporal   SpO2: 99%   Weight: 218 lb (98.9 kg)   Height: 5' 6\" (1.676 m)       Body mass index is 35.19 kg/m².    Physical Exam  Constitutional:       Appearance: Normal appearance.   HENT:      Head: Normocephalic and atraumatic.       Right Ear: External ear normal.      Left Ear: External ear normal.      Nose: Nose normal.      Mouth/Throat:      Mouth: Mucous membranes are moist.      Pharynx: Oropharynx is clear.   Eyes:      General: No scleral icterus.        Right eye: No discharge.         Left eye: No discharge.      Extraocular Movements: Extraocular movements intact.      Conjunctiva/sclera: Conjunctivae normal.      Pupils: Pupils are equal, round, and reactive to light.   Cardiovascular:      Rate and Rhythm: Normal rate and regular rhythm.      Pulses: Normal pulses.      Heart sounds: Normal heart sounds. No murmur heard.     No friction rub. No gallop.   Pulmonary:      Effort: Pulmonary effort is normal. No respiratory distress.      Breath sounds: No stridor. No wheezing or rales.   Abdominal:      General: Abdomen is flat. Bowel sounds are normal. There is no distension.      Palpations: There is no mass.      Tenderness: There is no abdominal tenderness. There is no guarding.   Genitourinary:     Comments: Exam deferred to patient's gynecologist.  Musculoskeletal:         General: No swelling. Normal range of motion.      Cervical back: Normal range of motion.      Right lower leg: No edema.      Left lower leg: No edema.   Skin:     General: Skin is warm.   Neurological:      General: No focal deficit present.      Mental Status: She is alert.   Psychiatric:         Mood and Affect: Mood normal.         Behavior: Behavior normal.           ASSESSMENT/PLAN:   1. Annual physical exam  CBC With Differential With Platelet; Future  Comp Metabolic Panel (14); Future  Lipid Panel; Future  TSH W Reflex To Free T4; Future  [employer] RADHA 2D+3D SCREENING BILAT (CPT=77067/66550); Future    2. Visit for screening mammogram  [facility] RADHA 2D+3D SCREENING BILAT (CPT=77067/17560); Future    3. Abnormal mammogram  [facility] RADHA 2D+3D SCREENING BILAT (CPT=77067/74568); Future    4. Mild hypercholesterolemia  Comp Metabolic Panel (14); Future  Lipid Panel;  Future    5. Class 2 obesity due to excess calories without serious comorbidity with body mass index (BMI) of 35.0 to 35.9 in adult  Continue exercise.  Needs to modify diet.    6. Anxiety  Off all medications.  Appears to be doing well.  Continue to monitor.    7. Cigarette nicotine dependence without complication  Tobacco Use Counseling 3-10 Min [41557]  Discussed for 3 minutes of benefits of quitting tobacco use.  Handout given on effective ways to stop smoking.    8. Colon cancer screening  Repeat colonoscopy in 2027.    Return in about 1 year (around 9/4/2025) for Complete physical.    This note was prepared using Dragon Medical voice recognition dictation software. As a result errors may occur. When identified these errors have been corrected. While every attempt is made to correct errors during dictation discrepancies may still exist.    Smith Navarro MD  9/4/2024    Tobacco cessation counseling for 3-10 minutes (add E/M code #26486).

## 2024-09-17 ENCOUNTER — OFFICE VISIT (OUTPATIENT)
Dept: PHYSICAL MEDICINE AND REHAB | Facility: CLINIC | Age: 44
End: 2024-09-17
Payer: COMMERCIAL

## 2024-09-17 VITALS — WEIGHT: 215 LBS | BODY MASS INDEX: 34.55 KG/M2 | HEIGHT: 66 IN

## 2024-09-17 DIAGNOSIS — M77.11 LATERAL EPICONDYLITIS OF RIGHT ELBOW: Primary | ICD-10-CM

## 2024-09-17 PROCEDURE — 99204 OFFICE O/P NEW MOD 45 MIN: CPT | Performed by: PHYSICAL MEDICINE & REHABILITATION

## 2024-09-17 RX ORDER — MELOXICAM 15 MG/1
15 TABLET ORAL DAILY
Qty: 10 TABLET | Refills: 0 | Status: SHIPPED | OUTPATIENT
Start: 2024-09-17

## 2024-09-17 NOTE — PROGRESS NOTES
NEW PATIENT VISIT    CHIEF COMPLAINT  Right forearm pain    HISTORY OF PRESENTING ILLNESS  Jyoti Fitch is a 44 year old female who presents for evaluation of right forearm pain.  She states that onset of symptoms was around 2 months ago.     She was doing a straight arm pull down at the gym. Noted pain all the up her right arm from the forearm to the right lateral upper arm. 2 months ago was the onset. She rested and iced her arm, and has had improvement, but now she continues ot have pain in the right lateral forearm. She feels tiredness in her right hand with some tightness. She states that she also has wrist pain on the right side.     She has tried ice and advil with some relief.     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   No imaging, no physical therapy.  Current level pain is rated 2/10.      PAST MEDICAL HISTORY  Past Medical History:    Anxiety    Colon polyps    repeat CLN in 2027    Esophageal reflux       PAST SURGICAL HISTORY  Past Surgical History:   Procedure Laterality Date    Colonoscopy N/A 7/8/2024    Procedure: COLONOSCOPY;  Surgeon: MINERVA Shaw MD;  Location: [facility] MAIN OR    Insert intrauterine device      Remove intrauterine device         MEDICATIONS  Current Outpatient Medications on File Prior to Visit   Medication Sig Dispense Refill    NUTRITIONAL SUPPLEMENTS OR Take by mouth.       No current facility-administered medications on file prior to visit.       ALLERGIES  No Known Allergies    SOCIAL HISTORY   reports that she has been smoking cigarettes. She has a 12.5 pack-year smoking history. She has been exposed to tobacco smoke. She has never used smokeless tobacco. She reports current alcohol use of about 1.0 standard drink of alcohol per week. She reports current drug use. Frequency: 7.00 times per week. Drug: Cannabis.    FAMILY HISTORY  No family history on file.    REVIEW OF SYSTEMS  Complete review of systems was performed and was  negative except for those items stated in the History of Presenting Illness and Past Medical/Surgical History.    PHYSICAL EXAMINATION  GENERAL:  In no acute distress. Well-developed and well nourished.   SKIN: No rashes or open wounds involving bilateral upper extremities.  NEUROLOGIC:   Strength: 5/5 bilaterally with shoulder abduction, elbow flexion, elbow extension, wrist flexion, wrist extension, wrist pronation, FDI, ADM, EIP and APB.   Sensation: intact light touch sensation throughout bilateral upper extremities, in all dermatomes and peripheral nerve distributions.   Reflexes: intact and symmetric in bilateral upper extremities. Bustillo’s negative.   MUSCULOSKELETAL:  Inspection: No effusion, soft tissue swelling, or deformity. No muscular atrophy.   Palpation: tenderness was present over the lateral epicondyle of the right upper extremity with extensor wad tenderness throughout distally..  ROM:   Cervical: full in all planes without pain.   Hand / wrist: Full in flexion and extension mild exacerbation of forearm pain with extension  Special Tests:   Spurling's: Negative   Finkelstein's: Negative negative   Eichhoff's: Negative   CMC grind: Negative   TFCC grind: Negative   Phalen's: Negative   Tinel's: Negative  Cozen's: Positive        REVIEW OF PRIOR X-RAYS/STUDIES  No pertinent imaging to review.    Will order an ultrasound of the right elbow evaluating for evidence of lateral epicondylitis.    IMPRESSION/DIAGNOSIS  Encounter Diagnosis   Name Primary?    Lateral epicondylitis of right elbow Yes       TREATMENT/PLAN  Will initiate 10-day meloxicam course as well as occupational therapy.  Ultrasound of the elbow as above.  We will follow-up after ultrasounds completed.    Education was provided regarding the above impression/diagnosis and treatment options/plan were discussed.  All questions were answered during today's visit.  Patient will contact clinic if any other questions or  concerns.            Telly Strong DO  Interventional Spine and Sports Medicine Specialist   Physical Medicine and Rehabilitation  [facility]  [address] [place] [address]    [facility]  [address] [place] [address]

## 2024-09-20 ENCOUNTER — LAB ENCOUNTER (OUTPATIENT)
Dept: LAB | Age: 44
End: 2024-09-20
Attending: INTERNAL MEDICINE
Payer: COMMERCIAL

## 2024-09-20 DIAGNOSIS — E78.00 MILD HYPERCHOLESTEROLEMIA: ICD-10-CM

## 2024-09-20 DIAGNOSIS — Z00.00 ANNUAL PHYSICAL EXAM: ICD-10-CM

## 2024-09-20 LAB
ALBUMIN SERPL-MCNC: 4.3 G/DL (ref 3.2–4.8)
ALBUMIN/GLOB SERPL: 1.7 {RATIO} (ref 1–2)
ALP LIVER SERPL-CCNC: 57 U/L
ALT SERPL-CCNC: 8 U/L
ANION GAP SERPL CALC-SCNC: 5 MMOL/L (ref 0–18)
AST SERPL-CCNC: 17 U/L (ref ?–34)
BASOPHILS # BLD AUTO: 0.03 X10(3) UL (ref 0–0.2)
BASOPHILS NFR BLD AUTO: 0.6 %
BILIRUB SERPL-MCNC: 0.4 MG/DL (ref 0.3–1.2)
BUN BLD-MCNC: 20 MG/DL (ref 9–23)
BUN/CREAT SERPL: 21.1 (ref 10–20)
CALCIUM BLD-MCNC: 9.7 MG/DL (ref 8.7–10.4)
CHLORIDE SERPL-SCNC: 109 MMOL/L (ref 98–112)
CHOLEST SERPL-MCNC: 204 MG/DL (ref ?–200)
CO2 SERPL-SCNC: 25 MMOL/L (ref 21–32)
CREAT BLD-MCNC: 0.95 MG/DL
DEPRECATED RDW RBC AUTO: 48.2 FL (ref 35.1–46.3)
EGFRCR SERPLBLD CKD-EPI 2021: 75 ML/MIN/1.73M2 (ref 60–?)
EOSINOPHIL # BLD AUTO: 0.06 X10(3) UL (ref 0–0.7)
EOSINOPHIL NFR BLD AUTO: 1.1 %
ERYTHROCYTE [DISTWIDTH] IN BLOOD BY AUTOMATED COUNT: 17.1 % (ref 11–15)
FASTING PATIENT LIPID ANSWER: YES
FASTING STATUS PATIENT QL REPORTED: YES
GLOBULIN PLAS-MCNC: 2.6 G/DL (ref 2–3.5)
GLUCOSE BLD-MCNC: 97 MG/DL (ref 70–99)
HCT VFR BLD AUTO: 33.8 %
HDLC SERPL-MCNC: 56 MG/DL (ref 40–59)
HGB BLD-MCNC: 10.8 G/DL
IMM GRANULOCYTES # BLD AUTO: 0.01 X10(3) UL (ref 0–1)
IMM GRANULOCYTES NFR BLD: 0.2 %
LDLC SERPL CALC-MCNC: 140 MG/DL (ref ?–100)
LYMPHOCYTES # BLD AUTO: 1.71 X10(3) UL (ref 1–4)
LYMPHOCYTES NFR BLD AUTO: 32.2 %
MCH RBC QN AUTO: 24.9 PG (ref 26–34)
MCHC RBC AUTO-ENTMCNC: 32 G/DL (ref 31–37)
MCV RBC AUTO: 78.1 FL
MONOCYTES # BLD AUTO: 0.53 X10(3) UL (ref 0.1–1)
MONOCYTES NFR BLD AUTO: 10 %
NEUTROPHILS # BLD AUTO: 2.97 X10 (3) UL (ref 1.5–7.7)
NEUTROPHILS # BLD AUTO: 2.97 X10(3) UL (ref 1.5–7.7)
NEUTROPHILS NFR BLD AUTO: 55.9 %
NONHDLC SERPL-MCNC: 148 MG/DL (ref ?–130)
OSMOLALITY SERPL CALC.SUM OF ELEC: 291 MOSM/KG (ref 275–295)
PLATELET # BLD AUTO: 350 10(3)UL (ref 150–450)
POTASSIUM SERPL-SCNC: 4.6 MMOL/L (ref 3.5–5.1)
PROT SERPL-MCNC: 6.9 G/DL (ref 5.7–8.2)
RBC # BLD AUTO: 4.33 X10(6)UL
SODIUM SERPL-SCNC: 139 MMOL/L (ref 136–145)
TRIGL SERPL-MCNC: 45 MG/DL (ref 30–149)
TSI SER-ACNC: 2.99 MIU/ML (ref 0.55–4.78)
VLDLC SERPL CALC-MCNC: 8 MG/DL (ref 0–30)
WBC # BLD AUTO: 5.3 X10(3) UL (ref 4–11)

## 2024-09-20 PROCEDURE — 84443 ASSAY THYROID STIM HORMONE: CPT

## 2024-09-20 PROCEDURE — 80053 COMPREHEN METABOLIC PANEL: CPT

## 2024-09-20 PROCEDURE — 36415 COLL VENOUS BLD VENIPUNCTURE: CPT

## 2024-09-20 PROCEDURE — 80061 LIPID PANEL: CPT

## 2024-09-20 PROCEDURE — 85025 COMPLETE CBC W/AUTO DIFF WBC: CPT

## 2024-09-25 DIAGNOSIS — R71.8 LOW MEAN CORPUSCULAR VOLUME (MCV): Primary | ICD-10-CM

## 2024-11-18 ENCOUNTER — HOSPITAL ENCOUNTER (OUTPATIENT)
Dept: ULTRASOUND IMAGING | Facility: HOSPITAL | Age: 44
Discharge: HOME | End: 2024-11-18
Attending: PHYSICAL MEDICINE & REHABILITATION
Payer: COMMERCIAL

## 2024-11-18 DIAGNOSIS — M77.11 LATERAL EPICONDYLITIS OF RIGHT ELBOW: ICD-10-CM

## 2024-11-18 PROCEDURE — 76881 US COMPL JOINT R-T W/IMG: CPT | Performed by: PHYSICAL MEDICINE & REHABILITATION

## 2025-03-12 ENCOUNTER — APPOINTMENT (OUTPATIENT)
Dept: GENERAL RADIOLOGY | Age: 45
End: 2025-03-12
Attending: NURSE PRACTITIONER
Payer: COMMERCIAL

## 2025-03-12 ENCOUNTER — HOSPITAL ENCOUNTER (OUTPATIENT)
Age: 45
Discharge: HOME | End: 2025-03-12
Payer: COMMERCIAL

## 2025-03-12 VITALS
TEMPERATURE: 99 F | OXYGEN SATURATION: 100 % | RESPIRATION RATE: 18 BRPM | HEART RATE: 78 BPM | DIASTOLIC BLOOD PRESSURE: 98 MMHG | SYSTOLIC BLOOD PRESSURE: 150 MMHG

## 2025-03-12 DIAGNOSIS — S89.91XA INJURY OF RIGHT KNEE, INITIAL ENCOUNTER: Primary | ICD-10-CM

## 2025-03-12 PROCEDURE — 73560 X-RAY EXAM OF KNEE 1 OR 2: CPT | Performed by: NURSE PRACTITIONER

## 2025-03-12 NOTE — ED PROVIDER NOTES
Patient Seen in: [facility]      History     Chief Complaint   Patient presents with    Knee Pain     Stated Complaint: Strain, Minor - Would like to be evaluated for knee injury; fell a few months a*  Subjective:   44-year-old female with no past medical history presents from home.  Patient is here for evaluation of her right knee.  Patient states a few months ago she fell down some steps.  Landed on her knees, landed on concrete.  No closed head injury.  Did not think much of it.  Did not seek medical care at that time.  States her shins were scraped up.  She notes that her right knee does not feel right to her.  States there is a divot in it.  No pain or tenderness.  The knee is not giving out.  No clicking.  She has still been able to lift weights.    The history is provided by the patient. No  was used.     Objective:   No pertinent past medical history.        HISTORY:  Past Medical History:    Anxiety    Colon polyps    repeat CLN in 2027    Esophageal reflux      Past Surgical History:   Procedure Laterality Date    Colonoscopy N/A 7/8/2024    Procedure: COLONOSCOPY;  Surgeon: MINERVA Shaw MD;  Location: [facility] MAIN OR    Insert intrauterine device      Remove intrauterine device        No family history on file.   Social History     Socioeconomic History    Marital status:    Tobacco Use    Smoking status: Every Day     Current packs/day: 0.50     Average packs/day: 0.5 packs/day for 25.0 years (12.5 ttl pk-yrs)     Types: Cigarettes     Passive exposure: Current    Smokeless tobacco: Never    Tobacco comments:     7 daily    Vaping Use    Vaping status: Never Used   Substance and Sexual Activity    Alcohol use: Yes     Alcohol/week: 1.0 standard drink of alcohol     Comment: Socially     Drug use: Yes     Frequency: 7.0 times per week     Types: Cannabis     Comment: daily    Sexual activity: Yes     Partners: Male     Birth control/protection: Condom   Other Topics  Concern    Breast feeding No    Reaction to local anesthetic No    Pt has a pacemaker No    Pt has a defibrillator No          No pertinent past surgical history.            No pertinent social history.          Review of Systems    Positive for stated complaint: Knee Pain    Other systems are as noted in HPI.  Constitutional and vital signs reviewed.      All other systems reviewed and negative except as noted above.    Physical Exam     ED Triage Vitals [03/12/25 1707]   BP (!) 150/98   Pulse 78   Resp 18   Temp 98.6 °F (37 °C)   Temp src Oral   SpO2 100 %   O2 Device None (Room air)     Current:BP (!) 150/98   Pulse 78   Temp 98.6 °F (37 °C) (Oral)   Resp 18   LMP 09/03/2024 (Exact Date)   SpO2 100%     Physical Exam  Vitals and nursing note reviewed.   Constitutional:       General: She is not in acute distress.     Appearance: Normal appearance. She is not ill-appearing or toxic-appearing.   HENT:      Head: Normocephalic and atraumatic.      Nose: Nose normal.      Mouth/Throat:      Mouth: Mucous membranes are moist.      Pharynx: Oropharynx is clear.   Eyes:      Pupils: Pupils are equal, round, and reactive to light.   Cardiovascular:      Rate and Rhythm: Normal rate.      Pulses: Normal pulses.   Pulmonary:      Effort: Pulmonary effort is normal. No respiratory distress.   Abdominal:      General: Abdomen is flat.      Palpations: Abdomen is soft.   Musculoskeletal:         General: No signs of injury. Normal range of motion.      Cervical back: Normal range of motion and neck supple.      Right knee: No swelling, deformity, bony tenderness or crepitus. Normal range of motion. No LCL laxity, MCL laxity, ACL laxity or PCL laxity.      Comments: There is an  anatomical difference between the right and left kneecap.  Right lateral patella has a palpable bony ridge.  No tenderness.  No swelling.  Full range of motion of the joint.   Skin:     General: Skin is warm and dry.      Capillary Refill:  Capillary refill takes less than 2 seconds.   Neurological:      General: No focal deficit present.      Mental Status: She is alert and oriented to person, place, and time.      GCS: GCS eye subscore is 4. GCS verbal subscore is 5. GCS motor subscore is 6.   Psychiatric:         Mood and Affect: Mood normal.         Behavior: Behavior normal.         Thought Content: Thought content normal.         Judgment: Judgment normal.         ED Course   Radiology:  No results found.  Labs Reviewed - No data to display  PROCEDURE: XR KNEE (1 OR 2 VIEWS), RIGHT (CPT=73560)      COMPARISON: None.      INDICATIONS: Abnormality in right patella post fall several months ago.      Findings and impression:  Normal alignment with no fracture   Finalized by (CST): Caden Lucero MD on 3/12/2025 at 5:34 PM   MDM     Medical Decision Making  Differential diagnoses reflecting the complexity of care include: Patella fracture, patella dislocation, anatomical difference  X-ray of the right knee is negative for fracture or dislocation.  No abnormality of patella noted  Discussed with patient that this could be a normal anatomic variant that she has.  No associated pain, tenderness, clicking, joint instability    Plan of Care: Follow-up with orthopedics if any further concerns.  She does see Ortho for her elbow    Results and plan of care discussed with the patient/family. They are in agreement with discharge. They understand to follow up with their primary doctor or the referral physician for further evaluation, especially if no improvement.  Also discussed the limitations of immediate care, patient is aware that if symptoms are worse they should go to the emergency room. Verbal and written discharge instructions were given.     My independent interpretation of studies of: Right knee x-ray negative for fracture or dislocation  Diagnostic tests and medications considered but not ordered were: No MRI available at this site  Shared decision  making was done by: Patient will follow-up with her Ortho  Comorbidities that add complexity to management include: None  External chart review was done and was noted: Reviewed, noncontributory  History obtained by an independent source was from: N/A  Discussions and management was done with: N/A  Social determinants of health that affect care: N/A              Problems Addressed:  Injury of right knee, initial encounter: acute illness or injury    Amount and/or Complexity of Data Reviewed  Radiology: ordered and independent interpretation performed. Decision-making details documented in ED Course.        Disposition and Plan     Clinical Impression:  1. Injury of right knee, initial encounter         Disposition:  Discharge  3/12/2025  5:54 pm    Follow-up:  Marvin Ro PA  [address] [place] [id number] [phone]    Call             Medications Prescribed:  Discharge Medication List as of 3/12/2025  5:58 PM

## 2025-03-24 ENCOUNTER — TELEPHONE (OUTPATIENT)
Dept: ORTHOPEDICS CLINIC | Facility: CLINIC | Age: 45
End: 2025-03-24

## 2025-03-24 NOTE — TELEPHONE ENCOUNTER
Patient has an appointment scheduled with Marvin Ro on 3/26/25 for right knee pain. Please advise if imaging is needed prior.

## 2025-03-26 ENCOUNTER — OFFICE VISIT (OUTPATIENT)
Dept: ORTHOPEDICS CLINIC | Facility: CLINIC | Age: 45
End: 2025-03-26
Payer: COMMERCIAL

## 2025-03-26 VITALS — HEIGHT: 66 IN | BODY MASS INDEX: 34.55 KG/M2 | WEIGHT: 215 LBS

## 2025-03-26 DIAGNOSIS — M22.2X1 PATELLOFEMORAL PAIN SYNDROME OF RIGHT KNEE: Primary | ICD-10-CM

## 2025-03-26 DIAGNOSIS — M17.11 PRIMARY OSTEOARTHRITIS OF RIGHT KNEE: ICD-10-CM

## 2025-03-26 PROCEDURE — 99203 OFFICE O/P NEW LOW 30 MIN: CPT | Performed by: PHYSICIAN ASSISTANT

## 2025-03-26 PROCEDURE — 3008F BODY MASS INDEX DOCD: CPT | Performed by: PHYSICIAN ASSISTANT

## 2025-03-26 RX ORDER — MELOXICAM 15 MG/1
15 TABLET ORAL DAILY
Qty: 30 TABLET | Refills: 0 | Status: SHIPPED | OUTPATIENT
Start: 2025-03-26 | End: 2025-04-25

## 2025-03-26 NOTE — H&P
[facility] - ORTHOPEDICS  [address] [place] [address]  [phone]     NEW PATIENT VISIT - HISTORY AND PHYSICAL EXAMINATION     Name: Jyoti Fitch   MRN: [id number]  Date: 3/26/2025     CC: Right knee pain.     REFERRED BY: Smith Navarro MD    HPI:   Jyoti Fitch is a very pleasant 44 year old female who presents today for evaluation, consultation, and management of RIGHT knee after a fall in November of 2024, she was walking up concrete steps and fell off the stairs. Pain is a 2/10. She eventually was evaluated at the [facility] [place] and was referred to our service for further evaluation. She complains of stiffness. 2/10 pain.       PMH:   Past Medical History:    Anxiety    Colon polyps    repeat CLN in 2027    Esophageal reflux       PAST SURGICAL HX:  Past Surgical History:   Procedure Laterality Date    Colonoscopy N/A 7/8/2024    Procedure: COLONOSCOPY;  Surgeon: MINERVA Shaw MD;  Location: [facility] MAIN OR    Insert intrauterine device      Remove intrauterine device         FAMILY HX:  History reviewed. No pertinent family history.    ALLERGIES:  Patient has no known allergies.    MEDICATIONS:   Current Outpatient Medications   Medication Sig Dispense Refill    Meloxicam 15 MG Oral Tab Take 1 tablet (15 mg total) by mouth daily. Take 1 tablet by mouth daily for 30 days with food. 30 tablet 0    Meloxicam 15 MG Oral Tab Take 1 tablet (15 mg total) by mouth daily. 10 tablet 0    NUTRITIONAL SUPPLEMENTS OR Take by mouth.         ROS: A comprehensive 14 point review of systems was performed and was negative aside from the aforementioned per history of present illness.    SOCIAL HX:  Social History     Occupational History    Not on file   Tobacco Use    Smoking status: Every Day     Current packs/day: 0.50     Average packs/day: 0.5 packs/day for 25.0 years (12.5 ttl pk-yrs)     Types: Cigarettes     Passive exposure: Current    Smokeless tobacco: Never     Tobacco comments:     7 daily    Vaping Use    Vaping status: Never Used   Substance and Sexual Activity    Alcohol use: Yes     Alcohol/week: 1.0 standard drink of alcohol     Comment: Socially     Drug use: Yes     Frequency: 7.0 times per week     Types: Cannabis     Comment: daily    Sexual activity: Yes     Partners: Male     Birth control/protection: Condom       PE:   Vitals:    03/26/25 0821   Weight: 215 lb (97.5 kg)   Height: 5' 6\" (1.676 m)     Estimated body mass index is 34.7 kg/m² as calculated from the following:    Height as of this encounter: 5' 6\" (1.676 m).    Weight as of this encounter: 215 lb (97.5 kg).    Physical Exam  Constitutional:       Appearance: Normal appearance.   HENT:      Head: Normocephalic and atraumatic.   Eyes:      Extraocular Movements: Extraocular movements intact.   Neck:      Musculoskeletal: Normal range of motion and neck supple.   Cardiovascular:      Pulses: Normal pulses.   Pulmonary:      Effort: Pulmonary effort is normal. No respiratory distress.   Abdominal:      General: There is no distension.   Skin:     General: Skin is warm.      Capillary Refill: Capillary refill takes less than 2 seconds.      Findings: No bruising.   Neurological:      General: No focal deficit present.      Mental Status: Alert.   Psychiatric:         Mood and Affect: Mood normal.     Examination of the right knee demonstrates:     Skin is intact, warm and dry.   Atrophy: none    Effusion: none    Joint line tenderness: none  Crepitation: none   Darby: Negative   Patellar mobility: normal without apprehension  J-sign: none    ROM: Extension full  Flexion 140 degrees  ACL:  Negative Lachman, Negative Pivot Shift   PCL:  Negative Posterior Drawer  Collateral Ligaments: Stable to Varus and Valgus stress at 0 and 30 degrees  Strength: normal   Hip joint: normal pain-free ROM   Gait:  normal   Leg length: equal and symmetric  Alignment:  neutral     No obvious peripheral edema noted.    Distal neurovascular exam demonstrates normal perfusion, intact sensation to light touch and full strength.     Examination of the contralateral knee demonstrates:  No significant atrophy, swelling or effusion. Full range of motion. Neurovascularly intact distally.    Radiographic Examination/Diagnostics:  I personally viewed, independently interpreted and radiology report was reviewed.      XR KNEE (1 OR 2 VIEWS), RIGHT (CPT=73560)    Result Date: 3/12/2025  PROCEDURE: XR KNEE (1 OR 2 VIEWS), RIGHT (CPT=73560)  COMPARISON: None.  INDICATIONS: Abnormality in right patella post fall several months ago.  Findings and impression:  Normal alignment with no fracture Finalized by (CST): Caden Lucero MD on 3/12/2025 at 5:34 PM            IMPRESSION: Jyoti Fitch is a 44 year old female who presents with right knee PFPS and PF DJD.     PLAN:   We had a detailed discussion outlining the etiology, anatomy, pathophysiology, and natural history of the patient's findings. Imaging was reviewed in detail and correlated to a 3-dimensional model of the patient's pathology.     We reviewed the treatment of this disease condition.  Fortunately, treatment is amenable to conservative treatment which we chose to optimize at today's visit.     We prescribed Meloxicam 15mg.     We recommended physical therapy to aid in strengthening, range of motion, functional improvement, and return to baseline activity.  The patient had the opportunity to ask questions and all questions were answered appropriately.      FOLLOW-UP:  6 weeks or as needed.           Marvin Ro [facility], PA-C Orthopedic Surgery / Sports Medicine Specialist  [facility]  [address] [place] [id number]   [email or web address]  [email or web address]  t: [phone]  o: [phone]  f: [phone]    This note was dictated using Dragon software.  While it was briefly proofread prior to completion, some grammatical, spelling, and word choice  errors due to dictation may still occur.

## 2025-08-06 ENCOUNTER — OFFICE VISIT (OUTPATIENT)
Dept: OBGYN CLINIC | Facility: CLINIC | Age: 45
End: 2025-08-06

## 2025-08-06 ENCOUNTER — LAB ENCOUNTER (OUTPATIENT)
Dept: LAB | Facility: HOSPITAL | Age: 45
End: 2025-08-06
Attending: ADVANCED PRACTICE MIDWIFE

## 2025-08-06 VITALS
WEIGHT: 220 LBS | BODY MASS INDEX: 35.36 KG/M2 | HEIGHT: 66 IN | SYSTOLIC BLOOD PRESSURE: 130 MMHG | DIASTOLIC BLOOD PRESSURE: 73 MMHG | HEART RATE: 76 BPM

## 2025-08-06 DIAGNOSIS — Z12.31 SCREENING MAMMOGRAM FOR BREAST CANCER: ICD-10-CM

## 2025-08-06 DIAGNOSIS — R45.89 SADNESS: ICD-10-CM

## 2025-08-06 DIAGNOSIS — Z13.0 SCREENING, ANEMIA, DEFICIENCY, IRON: ICD-10-CM

## 2025-08-06 DIAGNOSIS — N92.1 MENORRHAGIA WITH IRREGULAR CYCLE: ICD-10-CM

## 2025-08-06 DIAGNOSIS — Z13.29 SCREENING FOR THYROID DISORDER: ICD-10-CM

## 2025-08-06 DIAGNOSIS — N92.1 MENORRHAGIA WITH IRREGULAR CYCLE: Primary | ICD-10-CM

## 2025-08-06 DIAGNOSIS — N95.1 PERIMENOPAUSAL: ICD-10-CM

## 2025-08-06 DIAGNOSIS — N84.1 CERVICAL POLYP: ICD-10-CM

## 2025-08-06 LAB
DEPRECATED RDW RBC AUTO: 43.7 FL (ref 35.1–46.3)
ERYTHROCYTE [DISTWIDTH] IN BLOOD BY AUTOMATED COUNT: 13.2 % (ref 11–15)
HCT VFR BLD AUTO: 41.5 % (ref 35–48)
HGB BLD-MCNC: 13.8 G/DL (ref 12–16)
MCH RBC QN AUTO: 30.2 PG (ref 26–34)
MCHC RBC AUTO-ENTMCNC: 33.3 G/DL (ref 31–37)
MCV RBC AUTO: 90.8 FL (ref 80–100)
PLATELET # BLD AUTO: 305 10(3)UL (ref 150–450)
RBC # BLD AUTO: 4.57 X10(6)UL (ref 3.8–5.3)
T4 FREE SERPL-MCNC: 1 NG/DL (ref 0.8–1.7)
TSI SER-ACNC: 2.05 UIU/ML (ref 0.55–4.78)
WBC # BLD AUTO: 9.1 X10(3) UL (ref 4–11)

## 2025-08-06 PROCEDURE — 3008F BODY MASS INDEX DOCD: CPT | Performed by: ADVANCED PRACTICE MIDWIFE

## 2025-08-06 PROCEDURE — 84443 ASSAY THYROID STIM HORMONE: CPT | Performed by: ADVANCED PRACTICE MIDWIFE

## 2025-08-06 PROCEDURE — 84439 ASSAY OF FREE THYROXINE: CPT | Performed by: ADVANCED PRACTICE MIDWIFE

## 2025-08-06 PROCEDURE — 85027 COMPLETE CBC AUTOMATED: CPT | Performed by: ADVANCED PRACTICE MIDWIFE

## 2025-08-06 PROCEDURE — 3078F DIAST BP <80 MM HG: CPT | Performed by: ADVANCED PRACTICE MIDWIFE

## 2025-08-06 PROCEDURE — 99214 OFFICE O/P EST MOD 30 MIN: CPT | Performed by: ADVANCED PRACTICE MIDWIFE

## 2025-08-06 PROCEDURE — 3075F SYST BP GE 130 - 139MM HG: CPT | Performed by: ADVANCED PRACTICE MIDWIFE

## 2025-08-06 RX ORDER — BUPROPION HYDROCHLORIDE 150 MG/1
150 TABLET ORAL DAILY
Qty: 30 TABLET | Refills: 3 | Status: SHIPPED | OUTPATIENT
Start: 2025-08-06

## 2025-08-20 ENCOUNTER — OFFICE VISIT (OUTPATIENT)
Dept: OBGYN CLINIC | Facility: CLINIC | Age: 45
End: 2025-08-20

## 2025-08-20 VITALS
SYSTOLIC BLOOD PRESSURE: 165 MMHG | HEART RATE: 79 BPM | DIASTOLIC BLOOD PRESSURE: 91 MMHG | BODY MASS INDEX: 35.36 KG/M2 | WEIGHT: 220 LBS | HEIGHT: 66 IN

## 2025-08-20 DIAGNOSIS — F17.200 SMOKER UNMOTIVATED TO QUIT: ICD-10-CM

## 2025-08-20 DIAGNOSIS — Z01.812 PRE-PROCEDURE LAB EXAM: ICD-10-CM

## 2025-08-20 DIAGNOSIS — N92.6 IRREGULAR MENSTRUAL CYCLE: Primary | ICD-10-CM

## 2025-08-20 DIAGNOSIS — Z71.6 ENCOUNTER FOR TOBACCO USE CESSATION COUNSELING: ICD-10-CM

## 2025-08-20 PROBLEM — G56.03 CARPAL TUNNEL SYNDROME ON BOTH SIDES: Status: RESOLVED | Noted: 2018-02-13 | Resolved: 2025-08-20

## 2025-08-20 PROCEDURE — 81025 URINE PREGNANCY TEST: CPT | Performed by: ADVANCED PRACTICE MIDWIFE

## 2025-08-20 PROCEDURE — 3008F BODY MASS INDEX DOCD: CPT | Performed by: ADVANCED PRACTICE MIDWIFE

## 2025-08-20 PROCEDURE — 3080F DIAST BP >= 90 MM HG: CPT | Performed by: ADVANCED PRACTICE MIDWIFE

## 2025-08-20 PROCEDURE — 99212 OFFICE O/P EST SF 10 MIN: CPT | Performed by: ADVANCED PRACTICE MIDWIFE

## 2025-08-20 PROCEDURE — 3077F SYST BP >= 140 MM HG: CPT | Performed by: ADVANCED PRACTICE MIDWIFE

## 2025-08-20 PROCEDURE — 99406 BEHAV CHNG SMOKING 3-10 MIN: CPT | Performed by: ADVANCED PRACTICE MIDWIFE

## 2025-08-20 PROCEDURE — 58100 BIOPSY OF UTERUS LINING: CPT | Performed by: ADVANCED PRACTICE MIDWIFE

## (undated) DIAGNOSIS — E66.9 OBESITY (BMI 30-39.9): ICD-10-CM

## (undated) DIAGNOSIS — F41.9 ANXIETY: ICD-10-CM

## (undated) DIAGNOSIS — R63.5 WEIGHT GAIN: ICD-10-CM

## (undated) DIAGNOSIS — Z51.81 ENCOUNTER FOR THERAPEUTIC DRUG MONITORING: ICD-10-CM

## (undated) NOTE — LETTER
AUTHORIZATION FOR SURGICAL OPERATION OR OTHER PROCEDURE    1. I hereby authorize Shelby Sanchez, and [facility] staff assigned to my case to perform the following operation and/or procedure at the [facility] site:    _______________________________________________________________________________________________      _______________________________________________________________________________________________    2.  My physician has explained the nature and purpose of the operation or other procedure, possible alternative methods of treatment, the risks involved, and the possibility of complication to me.  I acknowledge that no guarantee has been made as to the result that may be obtained.  3.  I recognize that, during the course of this operation, or other procedure, unforseen conditions may necessitate additional or different procedure than those listed above.  I, therefore, further authorize and request that the above named physician, his/her physician assistants or designees perform such procedures as are, in his/her professional opinion, necessary and desirable.  4.  Any tissue or organs removed in the operation or other procedure may be disposed of by and at the discretion of the [facility] and [facility].  5.  I understand that in the event of a medical emergency, I will be transported by local paramedics to [facility] or other hospital emergency department.  6.  I certify that I have read and fully understand the above consent to operation and/or other procedure.    7.  I acknowledge that my physician has explained sedation/analgesia administration to me including the risks and benefits.  I consent to the administration of sedation/analgesia as may be necessary or desirable in the judgement of my physician.    Witness signature: ___________________________________________________ Date:  ______/______/_____                    Time:   ________ A.M.  P.M.       Patient Name:  ______________________________________________________  (please print)      Patient signature:  ___________________________________________________             Relationship to Patient:           []  Parent    Responsible person                          []  Spouse  In case of minor or                    [] Other  _____________   Incompetent name:  __________________________________________________                               (please print)      _____________      Responsible person  In case of minor or  Incompetent signature:  _______________________________________________    Statement of Physician  My signature below affirms that prior to the time of the procedure, I have explained to the patient and/or his/her guardian, the risks and benefits involved in the proposed treatment and any reasonable alternative to the proposed treatment.  I have also explained the risks and benefits involved in the refusal of the proposed treatment and have answered the patient's questions.                        Date:  ______/______/_______  Provider                      Signature:  __________________________________________________________       Time:  ___________ A.M    P.M.

## (undated) NOTE — LETTER
8/7/2019              Jyoti Fitch        [address] [facility] [address]         Dear [facility],    It was a pleasure to see you. Your PAP test with HPV was normal. Current guidelines would recommend a repeat Pap with HPV in 5 years.   An

## (undated) NOTE — LETTER
AUTHORIZATION FOR SURGICAL OPERATION OR OTHER PROCEDURE    1. I hereby authorize [facility], and [address] [place] [address] staff assigned to my case to perform the following operation and/or procedure at the [address] [place] [address]:    [id number]    2. My physician has explained the nature and purpose of the operation or other procedure, possible alternative methods of treatment, the risks involved, and the possibility of complication to me. I acknowledge that no guarantee has been made as to the result that may be obtained. 3.  I recognize that, during the course of this operation, or other procedure, unforseen conditions may necessitate additional or different procedure than those listed above. I, therefore, further authorize and request that the above named physician, his/her physician assistants or designees perform such procedures as are, in his/her professional opinion, necessary and desirable. 4.  Any tissue or organs removed in the operation or other procedure may be disposed of by and at the discretion of the [address] [place] [address] and [facility]. 5.  I understand that in the event of a medical emergency, I will be transported by local paramedics to [facility] or other hospital emergency department. 6.  I certify that I have read and fully understand the above consent to operation and/or other procedure. 7.  I acknowledge that my physician has explained sedation/analgesia administration to me including the risks and benefits. I consent to the administration of sedation/analgesia as may be necessary or desirable in the judgement of my physician. Witness signature: ___________________________________________________ Date:  ______/______/_____                    Time:  ________ A. M.  P.M.        Patient Name:  ______________________________________________________  (please print)      Patient signature: ___________________________________________________             Relationship to Patient:           []  Parent    Responsible person                          []  Spouse  In case of minor or                    [] Other  _____________   Incompetent name:  __________________________________________________                               (please print)      _____________      Responsible person  In case of minor or  Incompetent signature:  _______________________________________________    Statement of Physician  My signature below affirms that prior to the time of the procedure, I have explained to the patient and/or his/her guardian, the risks and benefits involved in the proposed treatment and any reasonable alternative to the proposed treatment. I have also explained the risks and benefits involved in the refusal of the proposed treatment and have answered the patient's questions.                         Date:  ______/______/_______  Provider                      Signature:  __________________________________________________________       Time:  ___________ A.M    P.M.

## (undated) NOTE — Clinical Note
Can you call patient to see if she wants to schedule hypnotherapy for smoking cessation and wt loss? Hope all is well.  Thank ISACC Oconnor

## (undated) NOTE — MR AVS SNAPSHOT
After Visit Summary   6/11/2024    Jyoti Fitch   MRN: [id number]           Visit Information     Date & Time  6/11/2024  9:00 AM Provider  Shelby Sanchez CNM Department  [facility] - Midwifery Dept. Phone  [phone]      Your Vitals Were  Most recent update: 6/11/2024  9:03 AM    BP   138/87 (BP Location: Right arm, Patient Position: Sitting)          Pulse   78          Ht   66\"          Wt   221 lb 9.6 oz          LMP   05/25/2024 (Approximate)             BMI   35.77 kg/m²         Allergies as of 6/11/2024  Review status set to Review Complete on 6/11/2024   No Known Allergies     Your Current Medications        Dosage    NUTRITIONAL SUPPLEMENTS OR Take by mouth.    Na Sulfate-K Sulfate-Mg Sulf (SUPREP BOWEL PREP KIT) 17.5-3.13-1.6 GM/177ML Oral Solution Take prep as directed by gastro office. May substitute with Trilyte/generic equivalent if needed.      Diagnoses for This Visit    Encounter for repeat Papanicolaou smear of cervix due to previous unsatisfactory results   [[id number]]  -  Primary           We Ordered the Following     Normal Orders This Visit    Hpv Dna  High Risk , Thin Prep Collect [[id number] CUSTOM]     THINPREP PAP SMEAR ONLY [[id number] CUSTOM]     ThinPrep PAP Smear [[id number] CUSTOM]     Future Labs/Procedures Expected by Expires    Hpv Dna  High Risk , Thin Prep Collect [[id number] CUSTOM]  6/11/2024 6/11/2025    ThinPrep PAP Smear [[id number] CUSTOM]  6/11/2024 6/11/2025      Future Appointments        Provider Department    6/18/2024 3:40 PM [address] [facility]    7/8/2024 1:15 PM [facility]                [place] you [place] [facility] now offer Video Visits through [employer] for adult patients for a variety of conditions such as allergies, back pain and cold symptoms? Skip the drive and waiting room and online chat with nitesh  doctor face-to-face using your web-cam enabled computer or mobile device wherever you are. Video Visits cost $50 and can be paid hassle-free using a credit, debit, or health savings card.  Not active on [employer]? Ask us how to get signed up today!          If you receive a survey from [facility], please take a few minutes to complete it and provide feedback. We strive to deliver the best patient experience and are looking for ways to make improvements. Your feedback will help us do so. For more information on [employer], please visit [email or web address]           No text in SmartText           No text in SmartText

## (undated) NOTE — Clinical Note
Hi,  Patient is on daily wellbutrin and desvenlafexine for mood. She has also met with Dr. Yahir Otero in the past- declined follow up today. Thanks for your feedback this patient.     Thank you,  SIACC Johnson

## (undated) NOTE — LETTER
Emma Costa, [address] [facility]       08/03/21        Patient: Hay Morales   YOB: 1980   Date of Visit: 8/3/2021       Dear  Dr. Ryan Coffman MD,      Thank you for referring Hay Morales to my practice.   Please f

## (undated) NOTE — Clinical Note
Sorry if I have sent this twice. Please evaluate and treat- Anxiety, +PHQ Score of 12BED 7+Thank ISACC Mejia